# Patient Record
Sex: FEMALE | Race: WHITE | NOT HISPANIC OR LATINO
[De-identification: names, ages, dates, MRNs, and addresses within clinical notes are randomized per-mention and may not be internally consistent; named-entity substitution may affect disease eponyms.]

---

## 2018-03-28 ENCOUNTER — RECORDS - HEALTHEAST (OUTPATIENT)
Dept: ADMINISTRATIVE | Facility: OTHER | Age: 33
End: 2018-03-28

## 2018-04-11 ENCOUNTER — RECORDS - HEALTHEAST (OUTPATIENT)
Dept: ADMINISTRATIVE | Facility: OTHER | Age: 33
End: 2018-04-11

## 2018-04-11 LAB
ABORH_EXT (HISTORICAL CONVERSION): NORMAL
ANTIBODY_EXT (HISTORICAL CONVERSION): NEGATIVE
HBSAG_EXT (HISTORICAL CONVERSION): NORMAL
HCV AB SERPL QL IA: NORMAL
HGB_EXT (HISTORICAL CONVERSION): 12.2
HIV_EXT: NORMAL
PLT_EXT - HISTORICAL: 365
RPR - HISTORICAL: NORMAL
RUBELLA_EXT (HISTORICAL CONVERSION): NORMAL

## 2018-04-25 ENCOUNTER — RECORDS - HEALTHEAST (OUTPATIENT)
Dept: ADMINISTRATIVE | Facility: OTHER | Age: 33
End: 2018-04-25

## 2018-05-04 ENCOUNTER — RECORDS - HEALTHEAST (OUTPATIENT)
Dept: ADMINISTRATIVE | Facility: OTHER | Age: 33
End: 2018-05-04

## 2018-05-24 ENCOUNTER — RECORDS - HEALTHEAST (OUTPATIENT)
Dept: ADMINISTRATIVE | Facility: OTHER | Age: 33
End: 2018-05-24

## 2018-06-04 ENCOUNTER — COMMUNICATION - HEALTHEAST (OUTPATIENT)
Dept: FAMILY MEDICINE | Facility: CLINIC | Age: 33
End: 2018-06-04

## 2018-06-07 ENCOUNTER — OFFICE VISIT - HEALTHEAST (OUTPATIENT)
Dept: MIDWIFE SERVICES | Facility: CLINIC | Age: 33
End: 2018-06-07

## 2018-06-07 DIAGNOSIS — J30.2 SEASONAL ALLERGIES: ICD-10-CM

## 2018-06-07 DIAGNOSIS — R11.2 NAUSEA AND VOMITING: ICD-10-CM

## 2018-06-07 DIAGNOSIS — E66.9 OBESITY: ICD-10-CM

## 2018-06-07 DIAGNOSIS — F12.20 MODERATE TETRAHYDROCANNABINOL (THC) DEPENDENCE (H): ICD-10-CM

## 2018-06-07 DIAGNOSIS — R87.619 ABNORMAL PAP SMEAR OF CERVIX: ICD-10-CM

## 2018-06-07 LAB
ALBUMIN UR-MCNC: ABNORMAL MG/DL
AMORPH CRY #/AREA URNS HPF: ABNORMAL /[HPF]
APPEARANCE UR: CLEAR
BACTERIA #/AREA URNS HPF: ABNORMAL HPF
BILIRUB UR QL STRIP: NEGATIVE
COLOR UR AUTO: YELLOW
GLUCOSE UR STRIP-MCNC: NEGATIVE MG/DL
HGB UR QL STRIP: NEGATIVE
KETONES UR STRIP-MCNC: NEGATIVE MG/DL
LEUKOCYTE ESTERASE UR QL STRIP: NEGATIVE
MUCOUS THREADS #/AREA URNS LPF: ABNORMAL LPF
NITRATE UR QL: NEGATIVE
PH UR STRIP: 7 [PH] (ref 5–8)
RBC #/AREA URNS AUTO: ABNORMAL HPF
SP GR UR STRIP: 1.02 (ref 1–1.03)
SQUAMOUS #/AREA URNS AUTO: ABNORMAL LPF
TRANS CELLS #/AREA URNS HPF: ABNORMAL LPF
UROBILINOGEN UR STRIP-ACNC: ABNORMAL
WBC #/AREA URNS AUTO: ABNORMAL HPF

## 2018-06-07 ASSESSMENT — MIFFLIN-ST. JEOR: SCORE: 1694.91

## 2018-06-08 LAB — BACTERIA SPEC CULT: NO GROWTH

## 2018-06-12 ENCOUNTER — COMMUNICATION - HEALTHEAST (OUTPATIENT)
Dept: MIDWIFE SERVICES | Facility: CLINIC | Age: 33
End: 2018-06-12

## 2018-06-13 ENCOUNTER — PRENATAL OFFICE VISIT - HEALTHEAST (OUTPATIENT)
Dept: MIDWIFE SERVICES | Facility: CLINIC | Age: 33
End: 2018-06-13

## 2018-06-13 DIAGNOSIS — Z34.02 ENCOUNTER FOR SUPERVISION OF NORMAL FIRST PREGNANCY IN SECOND TRIMESTER: ICD-10-CM

## 2018-06-13 DIAGNOSIS — F12.20 MODERATE TETRAHYDROCANNABINOL (THC) DEPENDENCE (H): ICD-10-CM

## 2018-06-13 DIAGNOSIS — R11.2 NAUSEA AND VOMITING: ICD-10-CM

## 2018-06-13 DIAGNOSIS — R82.71 GBS BACTERIURIA: ICD-10-CM

## 2018-06-13 LAB
ALBUMIN SERPL-MCNC: 3.3 G/DL (ref 3.5–5)
ALP SERPL-CCNC: 49 U/L (ref 45–120)
ALT SERPL W P-5'-P-CCNC: 15 U/L (ref 0–45)
ANION GAP SERPL CALCULATED.3IONS-SCNC: 8 MMOL/L (ref 5–18)
AST SERPL W P-5'-P-CCNC: 12 U/L (ref 0–40)
BASOPHILS # BLD AUTO: 0.1 THOU/UL (ref 0–0.2)
BASOPHILS NFR BLD AUTO: 1 % (ref 0–2)
BILIRUB SERPL-MCNC: 0.2 MG/DL (ref 0–1)
BUN SERPL-MCNC: 8 MG/DL (ref 8–22)
CALCIUM SERPL-MCNC: 9.5 MG/DL (ref 8.5–10.5)
CHLORIDE BLD-SCNC: 105 MMOL/L (ref 98–107)
CO2 SERPL-SCNC: 24 MMOL/L (ref 22–31)
CREAT SERPL-MCNC: 0.57 MG/DL (ref 0.6–1.1)
EOSINOPHIL # BLD AUTO: 0.1 THOU/UL (ref 0–0.4)
EOSINOPHIL NFR BLD AUTO: 1 % (ref 0–6)
ERYTHROCYTE [DISTWIDTH] IN BLOOD BY AUTOMATED COUNT: 12.4 % (ref 11–14.5)
GFR SERPL CREATININE-BSD FRML MDRD: >60 ML/MIN/1.73M2
GLUCOSE BLD-MCNC: 82 MG/DL (ref 70–125)
HCT VFR BLD AUTO: 32.8 % (ref 35–47)
HGB BLD-MCNC: 11.7 G/DL (ref 12–16)
LYMPHOCYTES # BLD AUTO: 2.7 THOU/UL (ref 0.8–4.4)
LYMPHOCYTES NFR BLD AUTO: 22 % (ref 20–40)
MCH RBC QN AUTO: 33.6 PG (ref 27–34)
MCHC RBC AUTO-ENTMCNC: 35.7 G/DL (ref 32–36)
MCV RBC AUTO: 94 FL (ref 80–100)
MONOCYTES # BLD AUTO: 0.8 THOU/UL (ref 0–0.9)
MONOCYTES NFR BLD AUTO: 7 % (ref 2–10)
NEUTROPHILS # BLD AUTO: 8.8 THOU/UL (ref 2–7.7)
NEUTROPHILS NFR BLD AUTO: 71 % (ref 50–70)
PLATELET # BLD AUTO: 309 THOU/UL (ref 140–440)
PMV BLD AUTO: 9.9 FL (ref 8.5–12.5)
POTASSIUM BLD-SCNC: 4.3 MMOL/L (ref 3.5–5)
PROT SERPL-MCNC: 6.5 G/DL (ref 6–8)
RBC # BLD AUTO: 3.48 MILL/UL (ref 3.8–5.4)
SODIUM SERPL-SCNC: 137 MMOL/L (ref 136–145)
TSH SERPL DL<=0.005 MIU/L-ACNC: 1.52 UIU/ML (ref 0.3–5)
WBC: 12.5 THOU/UL (ref 4–11)

## 2018-06-13 ASSESSMENT — MIFFLIN-ST. JEOR: SCORE: 1714.42

## 2018-06-19 ENCOUNTER — COMMUNICATION - HEALTHEAST (OUTPATIENT)
Dept: MIDWIFE SERVICES | Facility: CLINIC | Age: 33
End: 2018-06-19

## 2018-06-19 DIAGNOSIS — Z34.02 ENCOUNTER FOR SUPERVISION OF NORMAL FIRST PREGNANCY IN SECOND TRIMESTER: ICD-10-CM

## 2018-06-25 ENCOUNTER — OFFICE VISIT - HEALTHEAST (OUTPATIENT)
Dept: FAMILY MEDICINE | Facility: CLINIC | Age: 33
End: 2018-06-25

## 2018-06-25 DIAGNOSIS — Z34.02 ENCOUNTER FOR SUPERVISION OF NORMAL FIRST PREGNANCY IN SECOND TRIMESTER: ICD-10-CM

## 2018-06-25 DIAGNOSIS — G56.01 CARPAL TUNNEL SYNDROME OF RIGHT WRIST: ICD-10-CM

## 2018-06-25 ASSESSMENT — MIFFLIN-ST. JEOR: SCORE: 1714.42

## 2018-06-28 ENCOUNTER — AMBULATORY - HEALTHEAST (OUTPATIENT)
Dept: FAMILY MEDICINE | Facility: CLINIC | Age: 33
End: 2018-06-28

## 2018-06-28 ENCOUNTER — OFFICE VISIT - HEALTHEAST (OUTPATIENT)
Dept: FAMILY MEDICINE | Facility: CLINIC | Age: 33
End: 2018-06-28

## 2018-06-28 DIAGNOSIS — Z32.01 PREGNANCY TEST POSITIVE: ICD-10-CM

## 2018-06-28 DIAGNOSIS — J02.0 STREP PHARYNGITIS: ICD-10-CM

## 2018-06-28 DIAGNOSIS — J02.0 STREPTOCOCCAL SORE THROAT: ICD-10-CM

## 2018-06-28 LAB — DEPRECATED S PYO AG THROAT QL EIA: ABNORMAL

## 2018-06-28 ASSESSMENT — MIFFLIN-ST. JEOR: SCORE: 1732.56

## 2018-06-29 ENCOUNTER — COMMUNICATION - HEALTHEAST (OUTPATIENT)
Dept: MIDWIFE SERVICES | Facility: CLINIC | Age: 33
End: 2018-06-29

## 2018-06-29 ENCOUNTER — COMMUNICATION - HEALTHEAST (OUTPATIENT)
Dept: FAMILY MEDICINE | Facility: CLINIC | Age: 33
End: 2018-06-29

## 2018-07-03 ENCOUNTER — HOSPITAL ENCOUNTER (OUTPATIENT)
Dept: ULTRASOUND IMAGING | Facility: CLINIC | Age: 33
Discharge: HOME | End: 2018-07-03
Attending: ADVANCED PRACTICE MIDWIFE

## 2018-07-03 DIAGNOSIS — Z34.02 ENCOUNTER FOR SUPERVISION OF NORMAL FIRST PREGNANCY IN SECOND TRIMESTER: ICD-10-CM

## 2018-07-05 ENCOUNTER — AMBULATORY - HEALTHEAST (OUTPATIENT)
Dept: OBGYN | Facility: CLINIC | Age: 33
End: 2018-07-05

## 2018-07-05 ENCOUNTER — COMMUNICATION - HEALTHEAST (OUTPATIENT)
Dept: OBGYN | Facility: CLINIC | Age: 33
End: 2018-07-05

## 2018-07-05 DIAGNOSIS — Z34.02 ENCOUNTER FOR SUPERVISION OF NORMAL FIRST PREGNANCY IN SECOND TRIMESTER: ICD-10-CM

## 2018-07-05 DIAGNOSIS — R93.89 ABNORMAL ULTRASOUND: ICD-10-CM

## 2018-07-06 ENCOUNTER — COMMUNICATION - HEALTHEAST (OUTPATIENT)
Dept: MIDWIFE SERVICES | Facility: CLINIC | Age: 33
End: 2018-07-06

## 2018-07-06 DIAGNOSIS — Z33.1 PREGNANCY, INCIDENTAL: ICD-10-CM

## 2018-07-16 ENCOUNTER — COMMUNICATION - HEALTHEAST (OUTPATIENT)
Dept: FAMILY MEDICINE | Facility: CLINIC | Age: 33
End: 2018-07-16

## 2018-07-16 DIAGNOSIS — Z34.90 PREGNANT: ICD-10-CM

## 2018-07-16 DIAGNOSIS — G56.00 CTS (CARPAL TUNNEL SYNDROME): ICD-10-CM

## 2018-07-17 ENCOUNTER — HOSPITAL ENCOUNTER (OUTPATIENT)
Dept: ULTRASOUND IMAGING | Facility: CLINIC | Age: 33
Discharge: HOME | End: 2018-07-17
Attending: MIDWIFE

## 2018-07-17 DIAGNOSIS — Z34.02 ENCOUNTER FOR SUPERVISION OF NORMAL FIRST PREGNANCY IN SECOND TRIMESTER: ICD-10-CM

## 2018-07-17 DIAGNOSIS — R93.89 ABNORMAL ULTRASOUND: ICD-10-CM

## 2018-07-19 ENCOUNTER — COMMUNICATION - HEALTHEAST (OUTPATIENT)
Dept: MIDWIFE SERVICES | Facility: CLINIC | Age: 33
End: 2018-07-19

## 2018-07-20 ENCOUNTER — AMBULATORY - HEALTHEAST (OUTPATIENT)
Dept: OBGYN | Facility: CLINIC | Age: 33
End: 2018-07-20

## 2018-07-20 DIAGNOSIS — Z34.80 SUPERVISION OF OTHER NORMAL PREGNANCY, ANTEPARTUM: ICD-10-CM

## 2018-07-24 ENCOUNTER — PRENATAL OFFICE VISIT - HEALTHEAST (OUTPATIENT)
Dept: MIDWIFE SERVICES | Facility: CLINIC | Age: 33
End: 2018-07-24

## 2018-07-24 DIAGNOSIS — R11.2 NAUSEA AND VOMITING: ICD-10-CM

## 2018-07-24 DIAGNOSIS — Z34.02 ENCOUNTER FOR SUPERVISION OF NORMAL FIRST PREGNANCY IN SECOND TRIMESTER: ICD-10-CM

## 2018-07-24 ASSESSMENT — MIFFLIN-ST. JEOR: SCORE: 1750.71

## 2018-08-08 ENCOUNTER — COMMUNICATION - HEALTHEAST (OUTPATIENT)
Dept: FAMILY MEDICINE | Facility: CLINIC | Age: 33
End: 2018-08-08

## 2018-08-10 ENCOUNTER — COMMUNICATION - HEALTHEAST (OUTPATIENT)
Dept: MIDWIFE SERVICES | Facility: CLINIC | Age: 33
End: 2018-08-10

## 2018-08-13 ENCOUNTER — RECORDS - HEALTHEAST (OUTPATIENT)
Dept: ADMINISTRATIVE | Facility: OTHER | Age: 33
End: 2018-08-13

## 2018-08-30 ENCOUNTER — COMMUNICATION - HEALTHEAST (OUTPATIENT)
Dept: MIDWIFE SERVICES | Facility: CLINIC | Age: 33
End: 2018-08-30

## 2018-08-31 ENCOUNTER — COMMUNICATION - HEALTHEAST (OUTPATIENT)
Dept: MIDWIFE SERVICES | Facility: CLINIC | Age: 33
End: 2018-08-31

## 2018-08-31 ENCOUNTER — PRENATAL OFFICE VISIT - HEALTHEAST (OUTPATIENT)
Dept: MIDWIFE SERVICES | Facility: CLINIC | Age: 33
End: 2018-08-31

## 2018-08-31 DIAGNOSIS — G47.09 OTHER INSOMNIA: ICD-10-CM

## 2018-08-31 DIAGNOSIS — R11.2 NAUSEA AND VOMITING: ICD-10-CM

## 2018-08-31 DIAGNOSIS — O26.02 EXCESSIVE WEIGHT GAIN DURING PREGNANCY IN SECOND TRIMESTER: ICD-10-CM

## 2018-08-31 DIAGNOSIS — Z34.02 ENCOUNTER FOR SUPERVISION OF NORMAL FIRST PREGNANCY IN SECOND TRIMESTER: ICD-10-CM

## 2018-08-31 DIAGNOSIS — E66.812 CLASS 2 OBESITY IN ADULT: ICD-10-CM

## 2018-08-31 LAB
FASTING STATUS PATIENT QL REPORTED: NO
GLUCOSE 1H P 50 G GLC PO SERPL-MCNC: 115 MG/DL (ref 70–139)
HGB BLD-MCNC: 10.6 G/DL (ref 12–16)

## 2018-08-31 ASSESSMENT — MIFFLIN-ST. JEOR: SCORE: 1796.07

## 2018-09-01 ENCOUNTER — COMMUNICATION - HEALTHEAST (OUTPATIENT)
Dept: OBGYN | Facility: CLINIC | Age: 33
End: 2018-09-01

## 2018-09-01 LAB — T PALLIDUM AB SER QL: NEGATIVE

## 2018-09-04 ENCOUNTER — AMBULATORY - HEALTHEAST (OUTPATIENT)
Dept: MIDWIFE SERVICES | Facility: CLINIC | Age: 33
End: 2018-09-04

## 2018-09-28 ENCOUNTER — PRENATAL OFFICE VISIT - HEALTHEAST (OUTPATIENT)
Dept: MIDWIFE SERVICES | Facility: CLINIC | Age: 33
End: 2018-09-28

## 2018-09-28 DIAGNOSIS — Z23 NEED FOR VACCINATION: ICD-10-CM

## 2018-09-28 DIAGNOSIS — Z34.03 ENCOUNTER FOR SUPERVISION OF NORMAL FIRST PREGNANCY IN THIRD TRIMESTER: ICD-10-CM

## 2018-09-28 ASSESSMENT — MIFFLIN-ST. JEOR: SCORE: 1836.89

## 2018-10-03 ENCOUNTER — COMMUNICATION - HEALTHEAST (OUTPATIENT)
Dept: OBGYN | Facility: CLINIC | Age: 33
End: 2018-10-03

## 2018-10-08 ENCOUNTER — COMMUNICATION - HEALTHEAST (OUTPATIENT)
Dept: MIDWIFE SERVICES | Facility: CLINIC | Age: 33
End: 2018-10-08

## 2018-10-09 ENCOUNTER — PRENATAL OFFICE VISIT - HEALTHEAST (OUTPATIENT)
Dept: MIDWIFE SERVICES | Facility: CLINIC | Age: 33
End: 2018-10-09

## 2018-10-09 DIAGNOSIS — Z34.03 ENCOUNTER FOR SUPERVISION OF NORMAL FIRST PREGNANCY IN THIRD TRIMESTER: ICD-10-CM

## 2018-10-09 DIAGNOSIS — M54.9 BACK PAIN IN PREGNANCY: ICD-10-CM

## 2018-10-09 DIAGNOSIS — O99.891 BACK PAIN IN PREGNANCY: ICD-10-CM

## 2018-10-09 DIAGNOSIS — O99.210 OBESITY AFFECTING PREGNANCY, ANTEPARTUM: ICD-10-CM

## 2018-10-09 ASSESSMENT — MIFFLIN-ST. JEOR: SCORE: 1820.34

## 2018-10-12 ENCOUNTER — COMMUNICATION - HEALTHEAST (OUTPATIENT)
Dept: MIDWIFE SERVICES | Facility: CLINIC | Age: 33
End: 2018-10-12

## 2018-10-12 ENCOUNTER — RECORDS - HEALTHEAST (OUTPATIENT)
Dept: ADMINISTRATIVE | Facility: OTHER | Age: 33
End: 2018-10-12

## 2018-10-15 ENCOUNTER — RECORDS - HEALTHEAST (OUTPATIENT)
Dept: ADMINISTRATIVE | Facility: OTHER | Age: 33
End: 2018-10-15

## 2018-10-15 ENCOUNTER — PRENATAL OFFICE VISIT - HEALTHEAST (OUTPATIENT)
Dept: MIDWIFE SERVICES | Facility: CLINIC | Age: 33
End: 2018-10-15

## 2018-10-15 ENCOUNTER — COMMUNICATION - HEALTHEAST (OUTPATIENT)
Dept: MIDWIFE SERVICES | Facility: CLINIC | Age: 33
End: 2018-10-15

## 2018-10-15 ENCOUNTER — AMBULATORY - HEALTHEAST (OUTPATIENT)
Dept: MIDWIFE SERVICES | Facility: CLINIC | Age: 33
End: 2018-10-15

## 2018-10-15 DIAGNOSIS — O99.891 BACK PAIN COMPLICATING PREGNANCY IN THIRD TRIMESTER: ICD-10-CM

## 2018-10-15 DIAGNOSIS — M54.9 BACK PAIN COMPLICATING PREGNANCY IN THIRD TRIMESTER: ICD-10-CM

## 2018-10-15 DIAGNOSIS — Z34.03 ENCOUNTER FOR SUPERVISION OF NORMAL FIRST PREGNANCY IN THIRD TRIMESTER: ICD-10-CM

## 2018-10-15 RX ORDER — ASPIRIN/SOD BICARB/CITRIC ACID 324 MG
TABLET, EFFERVESCENT ORAL
Refills: 2 | Status: SHIPPED | COMMUNITY
Start: 2018-08-17

## 2018-10-15 ASSESSMENT — MIFFLIN-ST. JEOR: SCORE: 1843.92

## 2018-10-18 ENCOUNTER — OFFICE VISIT - HEALTHEAST (OUTPATIENT)
Dept: OBGYN | Facility: CLINIC | Age: 33
End: 2018-10-18

## 2018-10-18 ENCOUNTER — COMMUNICATION - HEALTHEAST (OUTPATIENT)
Dept: OBGYN | Facility: CLINIC | Age: 33
End: 2018-10-18

## 2018-10-18 DIAGNOSIS — Z71.89 PRENATAL CONSULT: ICD-10-CM

## 2018-10-18 DIAGNOSIS — Z34.03 ENCOUNTER FOR SUPERVISION OF NORMAL FIRST PREGNANCY IN THIRD TRIMESTER: ICD-10-CM

## 2018-10-18 DIAGNOSIS — Z30.09 STERILIZATION CONSULT: ICD-10-CM

## 2018-10-18 ASSESSMENT — MIFFLIN-ST. JEOR: SCORE: 1856.62

## 2018-10-19 ENCOUNTER — COMMUNICATION - HEALTHEAST (OUTPATIENT)
Dept: OBGYN | Facility: CLINIC | Age: 33
End: 2018-10-19

## 2018-10-26 ENCOUNTER — PRENATAL OFFICE VISIT - HEALTHEAST (OUTPATIENT)
Dept: MIDWIFE SERVICES | Facility: CLINIC | Age: 33
End: 2018-10-26

## 2018-10-26 ENCOUNTER — HOSPITAL ENCOUNTER (OUTPATIENT)
Dept: ULTRASOUND IMAGING | Facility: CLINIC | Age: 33
Discharge: HOME | End: 2018-10-26
Attending: ADVANCED PRACTICE MIDWIFE

## 2018-10-26 DIAGNOSIS — Z34.03 ENCOUNTER FOR SUPERVISION OF NORMAL FIRST PREGNANCY IN THIRD TRIMESTER: ICD-10-CM

## 2018-10-26 DIAGNOSIS — O99.210 OBESITY AFFECTING PREGNANCY, ANTEPARTUM: ICD-10-CM

## 2018-10-26 LAB
AMPHETAMINES UR QL SCN: NORMAL
BARBITURATES UR QL: NORMAL
BENZODIAZ UR QL: NORMAL
CANNABINOIDS UR QL SCN: NORMAL
COCAINE UR QL: NORMAL
CREAT UR-MCNC: 47.7 MG/DL
HGB BLD-MCNC: 11.6 G/DL (ref 12–16)
METHADONE UR QL SCN: NORMAL
OPIATES UR QL SCN: NORMAL
OXYCODONE UR QL: NORMAL
PCP UR QL SCN: NORMAL

## 2018-10-26 ASSESSMENT — MIFFLIN-ST. JEOR: SCORE: 1861.16

## 2018-10-27 ENCOUNTER — OFFICE VISIT - HEALTHEAST (OUTPATIENT)
Dept: FAMILY MEDICINE | Facility: CLINIC | Age: 33
End: 2018-10-27

## 2018-10-27 DIAGNOSIS — J02.9 SORE THROAT: ICD-10-CM

## 2018-10-27 LAB — DEPRECATED S PYO AG THROAT QL EIA: NORMAL

## 2018-10-28 LAB — GROUP A STREP BY PCR: NORMAL

## 2018-10-30 ENCOUNTER — COMMUNICATION - HEALTHEAST (OUTPATIENT)
Dept: MIDWIFE SERVICES | Facility: CLINIC | Age: 33
End: 2018-10-30

## 2018-11-01 ENCOUNTER — AMBULATORY - HEALTHEAST (OUTPATIENT)
Dept: MIDWIFE SERVICES | Facility: CLINIC | Age: 33
End: 2018-11-01

## 2018-11-01 ENCOUNTER — HOSPITAL ENCOUNTER (OUTPATIENT)
Dept: ULTRASOUND IMAGING | Facility: CLINIC | Age: 33
Discharge: HOME | End: 2018-11-01
Attending: ADVANCED PRACTICE MIDWIFE

## 2018-11-01 ENCOUNTER — PRENATAL OFFICE VISIT - HEALTHEAST (OUTPATIENT)
Dept: MIDWIFE SERVICES | Facility: CLINIC | Age: 33
End: 2018-11-01

## 2018-11-01 DIAGNOSIS — Z34.03 ENCOUNTER FOR SUPERVISION OF NORMAL FIRST PREGNANCY IN THIRD TRIMESTER: ICD-10-CM

## 2018-11-01 DIAGNOSIS — E66.812 CLASS 2 OBESITY IN ADULT: ICD-10-CM

## 2018-11-01 DIAGNOSIS — O99.210 OBESITY AFFECTING PREGNANCY, ANTEPARTUM: ICD-10-CM

## 2018-11-01 ASSESSMENT — MIFFLIN-ST. JEOR: SCORE: 1881.57

## 2018-11-08 ENCOUNTER — PRENATAL OFFICE VISIT - HEALTHEAST (OUTPATIENT)
Dept: MIDWIFE SERVICES | Facility: CLINIC | Age: 33
End: 2018-11-08

## 2018-11-08 ENCOUNTER — HOSPITAL ENCOUNTER (OUTPATIENT)
Dept: ULTRASOUND IMAGING | Facility: CLINIC | Age: 33
Discharge: HOME | End: 2018-11-08
Attending: ADVANCED PRACTICE MIDWIFE

## 2018-11-08 DIAGNOSIS — O16.3 ELEVATED BLOOD PRESSURE AFFECTING PREGNANCY IN THIRD TRIMESTER, ANTEPARTUM: ICD-10-CM

## 2018-11-08 DIAGNOSIS — O99.210 OBESITY AFFECTING PREGNANCY, ANTEPARTUM: ICD-10-CM

## 2018-11-08 DIAGNOSIS — Z34.03 ENCOUNTER FOR SUPERVISION OF NORMAL FIRST PREGNANCY IN THIRD TRIMESTER: ICD-10-CM

## 2018-11-08 LAB
ALT SERPL W P-5'-P-CCNC: 16 U/L (ref 0–45)
APTT PPP: 28 SECONDS (ref 24–37)
AST SERPL W P-5'-P-CCNC: 19 U/L (ref 0–40)
CREAT SERPL-MCNC: 0.64 MG/DL (ref 0.6–1.1)
CREAT UR-MCNC: 160.3 MG/DL
ERYTHROCYTE [DISTWIDTH] IN BLOOD BY AUTOMATED COUNT: 13.9 % (ref 11–14.5)
GFR SERPL CREATININE-BSD FRML MDRD: >60 ML/MIN/1.73M2
HCT VFR BLD AUTO: 35.1 % (ref 35–47)
HGB BLD-MCNC: 12 G/DL (ref 12–16)
INR PPP: 0.96 (ref 0.9–1.1)
MCH RBC QN AUTO: 33.6 PG (ref 27–34)
MCHC RBC AUTO-ENTMCNC: 34.2 G/DL (ref 32–36)
MCV RBC AUTO: 98 FL (ref 80–100)
PLATELET # BLD AUTO: 297 THOU/UL (ref 140–440)
PMV BLD AUTO: 11.5 FL (ref 8.5–12.5)
PROTEIN, RANDOM URINE - HISTORICAL: 40 MG/DL
PROTEIN/CREAT RATIO, RANDOM UR: 0.25
RBC # BLD AUTO: 3.57 MILL/UL (ref 3.8–5.4)
URATE SERPL-MCNC: 4.9 MG/DL (ref 2–7.5)
WBC: 10.6 THOU/UL (ref 4–11)

## 2018-11-08 ASSESSMENT — MIFFLIN-ST. JEOR: SCORE: 1871.59

## 2018-11-09 ENCOUNTER — COMMUNICATION - HEALTHEAST (OUTPATIENT)
Dept: OBGYN | Facility: CLINIC | Age: 33
End: 2018-11-09

## 2018-11-09 ENCOUNTER — PRENATAL OFFICE VISIT - HEALTHEAST (OUTPATIENT)
Dept: MIDWIFE SERVICES | Facility: CLINIC | Age: 33
End: 2018-11-09

## 2018-11-09 DIAGNOSIS — O13.3 GESTATIONAL HYPERTENSION WITHOUT SIGNIFICANT PROTEINURIA IN THIRD TRIMESTER: ICD-10-CM

## 2018-11-09 ASSESSMENT — MIFFLIN-ST. JEOR: SCORE: 1838.48

## 2018-11-12 ENCOUNTER — SURGERY - HEALTHEAST (OUTPATIENT)
Dept: SURGERY | Facility: CLINIC | Age: 33
End: 2018-11-12

## 2018-11-12 ENCOUNTER — ANESTHESIA - HEALTHEAST (OUTPATIENT)
Dept: SURGERY | Facility: CLINIC | Age: 33
End: 2018-11-12

## 2018-11-15 ENCOUNTER — HOME CARE/HOSPICE - HEALTHEAST (OUTPATIENT)
Dept: HOME HEALTH SERVICES | Facility: HOME HEALTH | Age: 33
End: 2018-11-15

## 2018-11-16 ENCOUNTER — HOME CARE/HOSPICE - HEALTHEAST (OUTPATIENT)
Dept: HOME HEALTH SERVICES | Facility: HOME HEALTH | Age: 33
End: 2018-11-16

## 2019-01-08 ENCOUNTER — COMMUNICATION - HEALTHEAST (OUTPATIENT)
Dept: MIDWIFE SERVICES | Facility: CLINIC | Age: 34
End: 2019-01-08

## 2019-02-04 ENCOUNTER — OFFICE VISIT - HEALTHEAST (OUTPATIENT)
Dept: MIDWIFE SERVICES | Facility: CLINIC | Age: 34
End: 2019-02-04

## 2019-02-04 DIAGNOSIS — E66.01 MORBID OBESITY (H): ICD-10-CM

## 2019-02-04 RX ORDER — DEXTROAMPHETAMINE SACCHARATE, AMPHETAMINE ASPARTATE, DEXTROAMPHETAMINE SULFATE AND AMPHETAMINE SULFATE 2.5; 2.5; 2.5; 2.5 MG/1; MG/1; MG/1; MG/1
TABLET ORAL
Refills: 0 | Status: SHIPPED | COMMUNITY
Start: 2019-01-28

## 2019-02-04 ASSESSMENT — MIFFLIN-ST. JEOR: SCORE: 1834.85

## 2019-09-21 ENCOUNTER — RECORDS - HEALTHEAST (OUTPATIENT)
Dept: GENERAL RADIOLOGY | Facility: CLINIC | Age: 34
End: 2019-09-21

## 2019-09-21 ENCOUNTER — OFFICE VISIT - HEALTHEAST (OUTPATIENT)
Dept: FAMILY MEDICINE | Facility: CLINIC | Age: 34
End: 2019-09-21

## 2019-09-21 DIAGNOSIS — M54.42 ACUTE LEFT-SIDED LOW BACK PAIN WITH LEFT-SIDED SCIATICA: ICD-10-CM

## 2019-09-21 DIAGNOSIS — M54.42 LUMBAGO WITH SCIATICA, LEFT SIDE: ICD-10-CM

## 2019-09-21 RX ORDER — ACETAMINOPHEN AND CODEINE PHOSPHATE 300; 30 MG/1; MG/1
1 TABLET ORAL 3 TIMES DAILY PRN
Qty: 30 TABLET | Refills: 0 | Status: SHIPPED | OUTPATIENT
Start: 2019-09-21

## 2019-09-26 ENCOUNTER — OFFICE VISIT - HEALTHEAST (OUTPATIENT)
Dept: FAMILY MEDICINE | Facility: CLINIC | Age: 34
End: 2019-09-26

## 2019-09-26 DIAGNOSIS — Z23 NEED FOR INFLUENZA VACCINATION: ICD-10-CM

## 2019-09-26 DIAGNOSIS — G89.29 CHRONIC BILATERAL LOW BACK PAIN WITHOUT SCIATICA: ICD-10-CM

## 2019-09-26 DIAGNOSIS — M54.50 CHRONIC BILATERAL LOW BACK PAIN WITHOUT SCIATICA: ICD-10-CM

## 2019-09-26 RX ORDER — DEXTROAMPHETAMINE SACCHARATE, AMPHETAMINE ASPARTATE MONOHYDRATE, DEXTROAMPHETAMINE SULFATE AND AMPHETAMINE SULFATE 7.5; 7.5; 7.5; 7.5 MG/1; MG/1; MG/1; MG/1
CAPSULE, EXTENDED RELEASE ORAL
Refills: 0 | Status: SHIPPED | COMMUNITY
Start: 2019-09-10

## 2019-09-26 ASSESSMENT — ANXIETY QUESTIONNAIRES
1. FEELING NERVOUS, ANXIOUS, OR ON EDGE: NOT AT ALL
5. BEING SO RESTLESS THAT IT IS HARD TO SIT STILL: NOT AT ALL
6. BECOMING EASILY ANNOYED OR IRRITABLE: NOT AT ALL
7. FEELING AFRAID AS IF SOMETHING AWFUL MIGHT HAPPEN: NOT AT ALL
3. WORRYING TOO MUCH ABOUT DIFFERENT THINGS: NOT AT ALL
2. NOT BEING ABLE TO STOP OR CONTROL WORRYING: NOT AT ALL
GAD7 TOTAL SCORE: 0
4. TROUBLE RELAXING: NOT AT ALL

## 2019-09-26 ASSESSMENT — PATIENT HEALTH QUESTIONNAIRE - PHQ9: SUM OF ALL RESPONSES TO PHQ QUESTIONS 1-9: 1

## 2019-09-27 ENCOUNTER — COMMUNICATION - HEALTHEAST (OUTPATIENT)
Dept: FAMILY MEDICINE | Facility: CLINIC | Age: 34
End: 2019-09-27

## 2019-09-27 DIAGNOSIS — M54.42 ACUTE LEFT-SIDED LOW BACK PAIN WITH LEFT-SIDED SCIATICA: ICD-10-CM

## 2019-09-27 RX ORDER — CYCLOBENZAPRINE HCL 10 MG
10 TABLET ORAL 3 TIMES DAILY PRN
Qty: 30 TABLET | Refills: 0 | Status: SHIPPED | OUTPATIENT
Start: 2019-09-27

## 2019-11-22 ENCOUNTER — COMMUNICATION - HEALTHEAST (OUTPATIENT)
Dept: MIDWIFE SERVICES | Facility: CLINIC | Age: 34
End: 2019-11-22

## 2021-05-26 ASSESSMENT — PATIENT HEALTH QUESTIONNAIRE - PHQ9: SUM OF ALL RESPONSES TO PHQ QUESTIONS 1-9: 1

## 2021-05-28 ASSESSMENT — ANXIETY QUESTIONNAIRES: GAD7 TOTAL SCORE: 0

## 2021-06-01 VITALS — BODY MASS INDEX: 36.94 KG/M2 | WEIGHT: 221.7 LBS | HEIGHT: 65 IN

## 2021-06-01 VITALS — HEIGHT: 65 IN | WEIGHT: 226 LBS | BODY MASS INDEX: 37.65 KG/M2

## 2021-06-01 VITALS — HEIGHT: 65 IN | BODY MASS INDEX: 38.32 KG/M2 | WEIGHT: 230 LBS

## 2021-06-01 VITALS — BODY MASS INDEX: 38.99 KG/M2 | HEIGHT: 65 IN | WEIGHT: 234 LBS

## 2021-06-01 NOTE — PROGRESS NOTES
Subjective:      Patient ID: Ton López is a 34 y.o. female.    Chief Complaint:    Back Pain   This is a new (having lower back pain since a week ago but started having worse pain yesterday as she was about to  her baby. Has history of ?disc protrusion and back pain while pregnant.) problem. The current episode started in the past 7 days. The problem occurs constantly. The pain is present in the lumbar spine. The quality of the pain is described as burning, shooting and stabbing. Radiates to: radiates to the left buttock. The pain is moderate. The symptoms are aggravated by bending, standing and sitting. Associated symptoms include leg pain. Pertinent negatives include no abdominal pain, bladder incontinence, bowel incontinence, fever, numbness, paresthesias, tingling or weakness. Risk factors include obesity and sedentary lifestyle. She has tried NSAIDs for the symptoms. The treatment provided mild relief.       The following portions of the patient's history were reviewed and updated as appropriate: allergies, current medications, past family history, past medical history, past social history, past surgical history and problem list.       Past Medical History:   Diagnosis Date     ADD (attention deficit disorder)     Prior to conception was taking Adderall 60 mg extended release daily and 10 mg instant release daily     Infertility, female     Unprotected sexual intercourse for 6 years before conception     Moderate tetrahydrocannabinol (THC) dependence (H)      Obesity      Seasonal allergies        Past Surgical History:   Procedure Laterality Date     APPENDECTOMY  1995       Family History   Problem Relation Age of Onset     Heart disease Mother      Heart disease Brother      Diabetes Maternal Grandmother      Cancer Maternal Grandfather        Social History     Tobacco Use     Smoking status: Never Smoker     Smokeless tobacco: Never Used   Substance Use Topics     Alcohol use: No     Drug  use: Yes     Types: Marijuana     Comment: several x  a day       Review of Systems   Constitutional: Negative for fatigue and fever.   Respiratory: Negative for cough.    Cardiovascular: Negative.    Gastrointestinal: Negative for abdominal pain and bowel incontinence.   Genitourinary: Negative for bladder incontinence.   Musculoskeletal: Positive for back pain and gait problem.   Skin: Negative for rash.   Neurological: Negative for tingling, weakness, numbness and paresthesias.       Objective:     /80   Pulse 63   Temp 98.3  F (36.8  C) (Oral)   Resp 16   Wt (!) 234 lb 4.8 oz (106.3 kg)   SpO2 97%   BMI 38.99 kg/m      Physical Exam   Constitutional: She appears well-developed and well-nourished. She appears distressed.   Some pain distress.   HENT:   Head: Normocephalic.   Neck: Normal range of motion.   Cardiovascular: Intact distal pulses.   Pulmonary/Chest: Effort normal.   Musculoskeletal:        Lumbar back: She exhibits decreased range of motion, pain and spasm.   There was an improvement on the back pain following use of Toradol.   Neurological: She is alert.       Assessment:     Procedures    1. Acute left-sided low back pain with left-sided sciatica  - XR Lumbar Spine 2 or 3 VWS; Future  - ketorolac injection 60 mg (TORADOL)  - cyclobenzaprine (FLEXERIL) 5 MG tablet; Take 1 tablet (5 mg total) by mouth 3 (three) times a day as needed for muscle spasms.  Dispense: 30 tablet; Refill: 0  - acetaminophen-codeine (TYLENOL #3) 300-30 mg per tablet; Take 1 tablet by mouth 3 (three) times a day as needed for pain.  Dispense: 30 tablet; Refill: 0      EXAM: XR LUMBAR SPINE 2 OR 3 VWS  LOCATION: [facility]  DATE/TIME: 9/21/2019 1:03 PM     INDICATION: Lower back pain  COMPARISON: None available at time of dictation  TECHNIQUE: Views of the spine were obtained and reviewed.     FINDINGS:  No acute fracture or spinal malalignment. Multilevel degenerative changes most prominent in the  L5-S1 where there is disc height loss and facet arthropathy.    Plan:   I am going to treat her as noted above at this time, as noted she does have any improvement with use of Toradol shot.  I have given her Tylenol codeine as well as muscle relaxants.  She does not have a primary care physician and I did encourage her to find one so that she can follow-up for the back pain.  The x-ray was reviewed as noted.  If she does continue to have more pain we will have seen as soon as possible.

## 2021-06-01 NOTE — PROGRESS NOTES
ASSESSMENT:  1. Chronic bilateral low back pain without sciatica    I did give her some of the cyclobenzaprine that seemed to work for her as far as her back pain goes.  She had gotten some Tylenol threes as well but hopefully she will be able to taper off of those.  We can talk at some point in the future about managing her back pain in a better way with therapy or anti-inflammatories or if more activity and losing weight etc. but we will get her past this acute flare of her chronic back pain and follow-up with her in a couple of weeks.    2. Need for influenza vaccination    - Influenza,Seasonal,Quad,INJ =/>6months          PLAN:  There are no Patient Instructions on file for this visit.    Orders Placed This Encounter   Procedures     Influenza,Seasonal,Quad,INJ =/>6months     Medications Discontinued During This Encounter   Medication Reason     oxyCODONE (ROXICODONE) 5 MG immediate release tablet Therapy completed     pyridoxine, vitamin B6, (VITAMIN B-6) 25 MG tablet Therapy completed     senna (SENOKOT) 8.6 mg tablet Therapy completed     ondansetron (ZOFRAN) 4 MG tablet Therapy completed     magnesium 200 mg Tab Therapy completed     folic acid (FOLVITE) 1 MG tablet Therapy completed     acetaminophen (TYLENOL) 500 MG tablet Therapy completed     calcium-vitamin D 500 mg(1,250mg) -200 unit per tablet Therapy completed     cetirizine (ZYRTEC) 10 MG tablet Therapy completed     cholecalciferol, vitamin D3, 2,000 unit capsule Therapy completed       No follow-ups on file.    CHIEF COMPLAINT:  Chief Complaint   Patient presents with     Establish Care     F/U from [facility] for Lt LBP/sciatica - meds given at [facility] have been helpful       HISTORY OF PRESENT ILLNESS:  Ton is a 34 y.o. female presenting to the clinic today for follow-up from the ED.  She went in with back pain and that note can be found in the chart.  We did review that together today.  She went in with back pain in the gave her some cyclobenzaprine  and that has helped her back pain and she would like to have a refill of that if possible.  They did give her all that many.  I also gave her a Toradol injection and did some x-rays.  They also gave her a few Tylenol threes but we discussed that understanding that that would be a short-term medication we would not treat her ongoing back pain with narcotics.  Her x-ray was unremarkable.    She does state that the back is better and that the cyclobenzaprine has been somewhat helpful.  She has been using some heat on it as well.  We did discuss some stretching and some other modalities such as physical therapy that we could consider going forward.    REVIEW OF SYSTEMS:     All other systems are negative.    PFSH:    Reviewed      TOBACCO USE:  Social History     Tobacco Use   Smoking Status Never Smoker   Smokeless Tobacco Never Used       VITALS:  Vitals:    09/26/19 1415   BP: 104/66   Patient Site: Left Arm   Patient Position: Sitting   Cuff Size: Adult Large   Pulse: 60   SpO2: 100%   Weight: (!) 233 lb 11.2 oz (106 kg)     Wt Readings from Last 3 Encounters:   09/26/19 (!) 233 lb 11.2 oz (106 kg)   09/21/19 (!) 234 lb 4.8 oz (106.3 kg)   02/04/19 (!) 252 lb 3.2 oz (114.4 kg)     Body mass index is 38.89 kg/m .    PHYSICAL EXAM:  Constitutional:  Well appearing patient in no acute distress.  Vitals:  Per nursing notes.    HEENT:  Normocephalic, atraumatic.  Ears are clear bilaterally, with no fluid or redness, and landmarks visible.  Pupils are equal and reactive to light, extraocular muscles intact, visual fields are full.  Nose is normal, and oropharynx is clear without redness.    Neck is without lymphadenopathy.    Lungs:  Clear to auscultation bilaterally without wheezes, rales or rhonchi.   Cardiac:  Regular rate and rhythm without murmurs, rubs, or gallops.     Legs show no cyanosis, clubbing or edema.  Palpation of the distal pulses are normal and symmetric.    Neurologic:  Cranial nerves II-XII intact.    Normal and symmetric reflexes in extremities, with normal strength and sensation.  Psychiatric:  Mood appropriate, memory intact.       ADDITIONAL HISTORY SUMMARIZED (2): [facility] notes reviewed  CARE EVERYWHERE/ EXTRA INFORMATION (1): None.   RADIOLOGY TESTS (1): Xray reviewed  LABS (1):   CARDIOLOGY/MEDICINE TESTS (1):   INDEPENDENT REVIEW (2 each): None.     Total data points:3            MEDICATIONS:  Current Outpatient Medications   Medication Sig Dispense Refill     acetaminophen-codeine (TYLENOL #3) 300-30 mg per tablet Take 1 tablet by mouth 3 (three) times a day as needed for pain. 30 tablet 0     ANTACID, CALCIUM CARBONATE, 200 mg calcium (500 mg) chewable tablet TK 2 TS PO TID  2     dextroamphetamine-amphetamine (ADDERALL XR) 30 MG 24 hr capsule TK 1 C PO BID DO NOT FILL UNTIL 07/05/2019  0     dextroamphetamine-amphetamine (ADDERALL) 10 mg Tab tablet TK 1 T PO BID PRN  0     cyclobenzaprine (FLEXERIL) 10 MG tablet Take 1 tablet (10 mg total) by mouth 3 (three) times a day as needed for muscle spasms. 30 tablet 0     No current facility-administered medications for this visit.

## 2021-06-01 NOTE — TELEPHONE ENCOUNTER
Medication Question or Clarification  Who is calling: Patient  What medication are you calling about? (include dose and sig)   cyclobenzaprine (FLEXERIL) 5 MG tablet 30 tablet 0 9/21/2019     Sig - Route: Take 1 tablet (5 mg total) by mouth 3 (three) times a day as needed for muscle spasms. - Oral    Sent to pharmacy as: cyclobenzaprine (FLEXERIL) 5 MG tablet      Who prescribed the medication?:   Gen Murray MD  What is your question/concern?:   Patient states she has 3 doses left of above medication.  Patient is taking differently at 10 mg, 3 times a day.  Patient is requesting the 10 mg tablets.  [id number]  Okay to leave a detailed message?: Yes  Site CMT - Please call the pharmacy to obtain any additional needed information.

## 2021-06-01 NOTE — TELEPHONE ENCOUNTER
Pt has increased the dosage   Would need an early refill    Please advise    Brandee Spears, CMA

## 2021-06-02 VITALS — BODY MASS INDEX: 43.32 KG/M2 | HEIGHT: 65 IN

## 2021-06-02 VITALS — WEIGHT: 258 LBS | BODY MASS INDEX: 42.93 KG/M2

## 2021-06-02 VITALS — HEIGHT: 65 IN | BODY MASS INDEX: 42.99 KG/M2 | WEIGHT: 258 LBS

## 2021-06-02 VITALS — WEIGHT: 262.5 LBS | BODY MASS INDEX: 43.74 KG/M2 | HEIGHT: 65 IN

## 2021-06-02 VITALS — HEIGHT: 65 IN | BODY MASS INDEX: 42.15 KG/M2 | WEIGHT: 253 LBS

## 2021-06-02 VITALS — HEIGHT: 65 IN | WEIGHT: 257 LBS | BODY MASS INDEX: 42.82 KG/M2

## 2021-06-02 VITALS — HEIGHT: 65 IN | WEIGHT: 252.2 LBS | BODY MASS INDEX: 42.02 KG/M2

## 2021-06-02 VITALS — HEIGHT: 65 IN | BODY MASS INDEX: 41.48 KG/M2 | WEIGHT: 249 LBS

## 2021-06-02 VITALS — BODY MASS INDEX: 42.15 KG/M2 | HEIGHT: 65 IN | WEIGHT: 253 LBS

## 2021-06-02 VITALS — HEIGHT: 65 IN | WEIGHT: 254.2 LBS | BODY MASS INDEX: 42.35 KG/M2

## 2021-06-02 VITALS — BODY MASS INDEX: 40.65 KG/M2 | HEIGHT: 65 IN | WEIGHT: 244 LBS

## 2021-06-02 VITALS — WEIGHT: 260.3 LBS | HEIGHT: 65 IN | BODY MASS INDEX: 43.37 KG/M2

## 2021-06-03 VITALS
SYSTOLIC BLOOD PRESSURE: 104 MMHG | HEART RATE: 60 BPM | WEIGHT: 233.7 LBS | BODY MASS INDEX: 38.89 KG/M2 | OXYGEN SATURATION: 100 % | DIASTOLIC BLOOD PRESSURE: 66 MMHG

## 2021-06-03 VITALS
RESPIRATION RATE: 16 BRPM | SYSTOLIC BLOOD PRESSURE: 120 MMHG | HEART RATE: 63 BPM | TEMPERATURE: 98.3 F | WEIGHT: 234.3 LBS | DIASTOLIC BLOOD PRESSURE: 80 MMHG | OXYGEN SATURATION: 97 % | BODY MASS INDEX: 38.99 KG/M2

## 2021-06-06 ENCOUNTER — HEALTH MAINTENANCE LETTER (OUTPATIENT)
Age: 36
End: 2021-06-06

## 2021-06-16 PROBLEM — G47.09 OTHER INSOMNIA: Status: ACTIVE | Noted: 2018-08-31

## 2021-06-16 PROBLEM — E66.01 MORBID OBESITY (H): Status: ACTIVE | Noted: 2019-02-04

## 2021-06-18 NOTE — PROGRESS NOTES
Assessment / Impression     1. Carpal tunnel syndrome of right wrist     2. Encounter for supervision of normal first pregnancy in second trimester           Plan:     Given patient's history, including the fact that she is into the latter portion of her second trimester pregnancy, as well as findings on physical exam, symptoms likely due to carpal tunnel syndrome.  Discussed with patient etiologies, especially during pregnancy, and oftentimes after delivery of baby, women symptoms may resolve.  For this reason, for now I do recommend wrist splinting at night.  Offered patient respond, though she says she has a very similar splint at home, will begin wearing at night.  Follow-up if symptoms persist despite night splinting over the next 2-3 weeks, could consider referral to orthopedics for further evaluation at that time.  Patient understands, agrees with plan.  -Of note we did have recent normal TSH lab checked.    Subjective:      HPI: Ton López is a 33 y.o. righthanded female, new to family medicine, who presents for right wrist and hand pain.  Patient states she had has had intermittent hand tingling and numbness for about the last year, and it would come on usually at night or as the day went on, and if she shook out her hand her symptoms seem to resolve.  Patient is approximately 5 months pregnant currently, and stated that about the last 2 weeks, she noted more frequent right wrist and tingling sensation in her palm and her second and third digits.  She has never noted any symptoms in her fifth digit.  Symptoms seem to be worst in the middle the night.  She has tried taking Tylenol, though has not really helped her symptoms.  She did wear a wrist splint briefly several months ago though not recently.  No symptoms in her left hand.      Medical History:     Patient Active Problem List   Diagnosis     Nausea and vomiting     Obesity     Moderate tetrahydrocannabinol (THC) dependence (H)     Abnormal Pap  smear of cervix     Seasonal allergies     Encounter for supervision of normal first pregnancy in second trimester     GBS bacteriuria       Past Medical History:   Diagnosis Date     Abnormal Pap smear of cervix 2016     ADD (attention deficit disorder)     Prior to conception was taking Adderall 60 mg extended release daily and 10 mg instant release daily     Headache      Infertility, female     Unprotected sexual intercourse for 6 years before conception     Moderate tetrahydrocannabinol (THC) dependence (H)      Obesity      Seasonal allergies        Past Surgical History:   Procedure Laterality Date     APPENDECTOMY  1995       Current Medications:     Current Outpatient Prescriptions   Medication Sig Note     calcium, as carbonate, (OS-ARNALDO) 500 mg calcium (1,250 mg) tablet Take 2 tablets (1,000 mg total) by mouth 3 (three) times a day.      cetirizine (ZYRTEC) 10 MG tablet Take 10 mg by mouth daily.      cholecalciferol, vitamin D3, 2,000 unit capsule Take 1 capsule (2,000 Units total) by mouth daily.      docusate sodium (COLACE) 50 MG capsule Take 2 capsules (100 mg total) by mouth 2 (two) times a day.      doxylamine (UNISOM) 25 mg tablet Take 1 tablet (25 mg total) by mouth daily.      folic acid (FOLVITE) 1 MG tablet  6/7/2018: Received from: External Pharmacy     magnesium 200 mg Tab Take 1 tablet by mouth at bedtime.      ondansetron (ZOFRAN-ODT) 8 MG disintegrating tablet Take 1 tablet (8 mg total) by mouth 4 (four) times a day.      pyridoxine, vitamin B6, (VITAMIN B-6) 25 MG tablet Take 25 mg by mouth 2 (two) times a day.      senna (SENOKOT) 8.6 mg tablet Take 1 tablet by mouth daily.        Family History:     Family History   Problem Relation Age of Onset     Heart disease Mother      Heart disease Brother      Diabetes Maternal Grandmother      Cancer Maternal Grandfather        Review of Systems  All other systems reviewed and are negative.         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  Does a lot of typing.        Objective:     /78 (Patient Site: Left Arm, Patient Position: Sitting, Cuff Size: Adult Regular)  Pulse 74  Ht 5' 4.9\" (1.648 m)  Wt (!) 226 lb (102.5 kg)  LMP 01/31/2018  BMI 37.72 kg/m2  Physical Examination: General appearance - alert, well appearing, and in no distress  Eyes: pupils equal and reactive, extraocular eye movements intact  Neurological: alert, oriented, normal speech, no focal findings or movement disorder noted.    Extremities: No edema, no clubbing or cyanosis  Musculoskeletal: normal hand  strength.  She did have positive Tinel's test and positive Phalen test of her right upper extremity.  No tenderness to palpation over the medial or lateral epicondyle.  Pulses of right upper extremity grossly intact.  Psychiatric: Normal affect. Does not appear anxious or depressed.    No results found for this or any previous visit (from the past 168 hour(s)).      Sury Perea MD  6/25/2018  2:26 PM        "

## 2021-06-18 NOTE — PROGRESS NOTES
Transfer of care Visit    Transfer of care from: [facility]  Number of visits: 5  Initial visit date: 3/28/2018 for confirmation of pregnancy visit  Pre-preg wgt: 216 pounds  Initial platelets: 365K  UC: GBS bacteriuria  GBS status if known: GBS bacteriuria on IOB  GCT if done: Both negative  Risks, progress thus far: Prepregnancy obesity and THC dependence  Reason for transfer: Desires to deliver at , and desiring closer medical attention to nausea and vomiting of pregnancy    FIRST TRANSFER OF CARE OBSTETRICAL EXAM - OB   Assessment / Impression   1.  33-year-old  1 para 0 with IUP at 17 weeks 1 day  2.  Significant nausea and vomiting of pregnancy (not meeting criteria for hyperemesis gravidarum) managed with p.o. vitamin B6,  Unisom and p.o. Zofran  3.  Chronic THC use over 15 years, continuing to use 2-3 times daily  4.  Obesity  5.  GBS bacteriuria    Plan:      -IOB labs results reviewed.  -CBC, CMP, thyroid cascade today secondary to nausea and vomiting of pregnancy.  -UA completed last week on 2018  -Pt is not at risk for and therefore not interested in drawing lead level.   -Patient is NOT interested in waterbirth.   -Reviewed prenatal care schedule.   -Optimal nutrition and weight gain discussed. Pregnancy weight gain of 11-20 lbs (BMI 30.0 or greater) encouraged.   -Anticipatory guidance for common pregnancy questions and concerns reviewed.   -Danger s/sx for this trimester reviewed with patient.   -Reviewed genetic screening options with patient, does NOT elect for quad screening.   -Reviewed carrier testing options with patient, patient does NOT elect for testing or referral to genetic counseling.  -IOB packet given and reviewed with patient.   -CNM services and hospital options reviewed; emergency and scheduling phone numbers given to patient.   -Return to clinic in 4 weeks or sooner as needed  -Prescriptions for magnesium, Colace and calcium sent to patient's  preferred pharmacy.  Continue vitamin B6 and Zofran as directed.  Call or return to clinic if nausea and vomiting of pregnancy worsens.  -Plan GBS IV antibiotic prophylaxis in labor.  -Recommend cessation of THC use and discussed fetal effects.    Total time spent with patient: 40 minutes, >50% time spent counseling and coordinating care.   Subjective: (HPI)   Ton López is a 33 y.o. G 1 p 0 here today for her first obstetrical exam at 17w3d. Here by herself. This pregnancy is planned Attempting pregnancy for 6 years. The patient reports nausea and vomiting about 4 times per day since starting Zofran.  Prepregnancy weight 216 pounds, and today's weight is 226 pounds.  Patient's last menstrual period was 2018., predicting an expected date of delivery of Estimated Date of Delivery: 2018.  However, patient underwent an 8 week 1 day ultrasound on 2018 revealing an estimated date of birth of 2018 WORKING SKIP.  Her pregnancy is dated by the 8 week 1 day ultrasound on 2018 predicting SKIP 2018.   The patient states that she is in a monogamous relationship and states that she is safe.   Current symptoms also include: breast tenderness and fatigue.   Risk factors: Prepregnancy obesity and chronic THC use ×15 years, current. Pregnancy Risk Factors: Significantly overweight or underweight and Street drug use   Social History:   Education level: High school graduate  Occupation: Psychiatry assistant  Partners name: Deven   ?   OB History    Para Term  AB Living   1        SAB TAB Ectopic Multiple Live Births             # Outcome Date GA Lbr Frederick/2nd Weight Sex Delivery Anes PTL Lv   1 Current                  History:   Past Medical History:   Diagnosis Date     Abnormal Pap smear of cervix 2016     ADD (attention deficit disorder)     Prior to conception was taking Adderall 60 mg extended release daily and 10 mg instant release daily     Headache      Infertility, female      Unprotected sexual intercourse for 6 years before conception     Moderate tetrahydrocannabinol (THC) dependence (H)      Obesity      Seasonal allergies       Past Surgical History:   Procedure Laterality Date     APPENDECTOMY  1995      Family History   Problem Relation Age of Onset     Heart disease Mother      Heart disease Brother      Diabetes Maternal Grandmother      Cancer Maternal Grandfather       Social History   Substance Use Topics     Smoking status: Never Smoker     Smokeless tobacco: Never Used     Alcohol use None      Current Outpatient Prescriptions   Medication Sig Dispense Refill     cetirizine (ZYRTEC) 10 MG tablet Take 10 mg by mouth daily.       senna (SENOKOT) 8.6 mg tablet Take 1 tablet by mouth daily.       calcium, as carbonate, (OS-ARNALDO) 500 mg calcium (1,250 mg) tablet Take 2 tablets (1,000 mg total) by mouth 3 (three) times a day. 60 tablet 0     docusate sodium (COLACE) 50 MG capsule Take 2 capsules (100 mg total) by mouth 2 (two) times a day. 60 capsule 2     doxylamine (UNISOM) 25 mg tablet Take 1 tablet (25 mg total) by mouth daily. 30 tablet 2     folic acid (FOLVITE) 1 MG tablet   0     hydrOXYzine HCl (ATARAX) 50 MG tablet   0     magnesium 200 mg Tab Take 1 tablet by mouth at bedtime. 30 each 0     ondansetron (ZOFRAN-ODT) 8 MG disintegrating tablet Take 1 tablet (8 mg total) by mouth 4 (four) times a day. 120 tablet 0     pyridoxine, vitamin B6, (VITAMIN B-6) 25 MG tablet Take 25 mg by mouth 2 (two) times a day.       No current facility-administered medications for this visit.       Allergies   Allergen Reactions     Latex Dermatitis      The patient's medical, surgical and family histories were reviewed and were pertinent to this visit.   Pap smear: Last Pap: 8/18/2016, Result: Negative.       EPDS score today: 8/30  Review of Systems   General: Fatigue but otherwise denies problem   Eyes: Denies problem   Ears/Nose/Throat: Denies problem   Cardiovascular: Denies problem  "  Respiratory: Denies problem   Gastrointestinal: See HPI  Genitourinary: Denies any discharge, vaginal bleeding or itchiness or any other problem   Musculoskeletal: Breast tenderness otherwise denies problem   Skin: Denies problem   Neurologic: Denies problem   Psychiatric: Denies problem   Endocrine: Denies problem   Heme/Lymphatic: Denies problem   Allergic/Immunologic: Denies problem       Objective:   Objective    Vitals:    06/13/18 1505   BP: 114/60   Pulse: 84   Weight: (!) 226 lb (102.5 kg)   Height: 5' 4.9\" (1.648 m)      Physical Exam:   General Appearance: Alert, cooperative, no distress, appears stated age   FHT: 156 bpm  Uterus: Nontender, enlarged approximately 18 weeks size   Musculoskeletal: Extremities normal, atraumatic, no cyanosis   Skin: Skin color, texture, turgor normal, no rashes or lesions   Lymph nodes: Cervical, supraclavicular, and axillary nodes normal   Neurologic: Alert and oriented x 3. Normal speech   Lab:   Results for orders placed or performed in visit on 06/13/18   Comprehensive Metabolic Panel   Result Value Ref Range    Sodium 137 136 - 145 mmol/L    Potassium 4.3 3.5 - 5.0 mmol/L    Chloride 105 98 - 107 mmol/L    CO2 24 22 - 31 mmol/L    Anion Gap, Calculation 8 5 - 18 mmol/L    Glucose 82 70 - 125 mg/dL    BUN 8 8 - 22 mg/dL    Creatinine 0.57 (L) 0.60 - 1.10 mg/dL    GFR MDRD Af Amer >60 >60 mL/min/1.73m2    GFR MDRD Non Af Amer >60 >60 mL/min/1.73m2    Bilirubin, Total 0.2 0.0 - 1.0 mg/dL    Calcium 9.5 8.5 - 10.5 mg/dL    Protein, Total 6.5 6.0 - 8.0 g/dL    Albumin 3.3 (L) 3.5 - 5.0 g/dL    Alkaline Phosphatase 49 45 - 120 U/L    AST 12 0 - 40 U/L    ALT 15 0 - 45 U/L   Thyroid Cascade   Result Value Ref Range    TSH 1.52 0.30 - 5.00 uIU/mL   HM1 (CBC with Diff)   Result Value Ref Range    WBC 12.5 (H) 4.0 - 11.0 thou/uL    RBC 3.48 (L) 3.80 - 5.40 mill/uL    Hemoglobin 11.7 (L) 12.0 - 16.0 g/dL    Hematocrit 32.8 (L) 35.0 - 47.0 %    MCV 94 80 - 100 fL    MCH 33.6 27.0 " - 34.0 pg    MCHC 35.7 32.0 - 36.0 g/dL    RDW 12.4 11.0 - 14.5 %    Platelets 309 140 - 440 thou/uL    MPV 9.9 8.5 - 12.5 fL    Neutrophils % 71 (H) 50 - 70 %    Lymphocytes % 22 20 - 40 %    Monocytes % 7 2 - 10 %    Eosinophils % 1 0 - 6 %    Basophils % 1 0 - 2 %    Neutrophils Absolute 8.8 (H) 2.0 - 7.7 thou/uL    Lymphocytes Absolute 2.7 0.8 - 4.4 thou/uL    Monocytes Absolute 0.8 0.0 - 0.9 thou/uL    Eosinophils Absolute 0.1 0.0 - 0.4 thou/uL    Basophils Absolute 0.1 0.0 - 0.2 thou/uL   OB External Results   Result Value Ref Range    ABO/Rh External Result A Positive     Antibody External Result Negative     Hemoglobin External Result 12.2     Rubella External Result Immune     HBsAg External Result Non-reactive     HIV External Result Non-Reactive     Hep C External Result Non-reactive     Platelet Count External Result 365     RPR External Result Non-Reactive     Urinalysis   Order: [id number]   Status:  Final result   Visible to patient:  Yes (MyChart)   Next appt:  07/03/2018 at 05:00 PM in Radiology ([facility] Rm 2)   Dx:  Nausea and vomiting      Ref Range & Units 6/7/18  2:30 PM     Color, UA Colorless, Yellow, Straw, Light Yellow Yellow   Clarity, UA Clear Clear   Glucose, UA Negative Negative   Bilirubin, UA Negative Negative   Ketones, UA Negative Negative   Specific Gravity, UA 1.005 - 1.030 1.025   Blood, UA Negative Negative   pH, UA 5.0 - 8.0 7.0   Protein, UA Negative mg/dL 100 mg/dL (!)   Urobilinogen, UA 0.2 E.U./dL, 1.0 E.U./dL 0.2 E.U./dL   Nitrite, UA Negative Negative   Leukocytes, UA Negative Negative   Resulting Agency  [facility]      Specimen Collected: 06/07/18  2:30 PM Last Resulted: 06/07/18  2:34 PM Lab Flowsheet Order Details View Encounter Lab and Collection Details Routing Result History

## 2021-06-18 NOTE — PROGRESS NOTES
"OFFICE VISIT - FAMILY MEDICINE     ASSESSMENT AND PLAN     1. Strep pharyngitis  Rapid Strep A Screen- Throat Swab    amoxicillin (AMOXIL) 875 MG tablet   2. Pregnancy test positive     Continue with good hydration, extra vitamin C, gargle with salt and water, take the prescribed antibiotic as directed, possible side effect discussed.  Return if not improving.  Continue to follow with obstetrician for her pregnancy.    CHIEF COMPLAINT   Sore Throat (X 2 DAYS)    HPI   Ton López is a 33 y.o. female.  No Patient Care Coordination Note on file.  Sore throat with sensation of swollen gland in the throat for the past couple of days, feverish episode, decreased appetite, no specific exposure that she is aware of.  Has not tried any medication.  She is currently pregnant at around 5 months and has been followed by the obstetrician, she still taking Zofran as needed for nausea and vomiting of pregnancy, denies any worsening symptoms.      Review of Systems As per HPI, otherwise negative.    OBJECTIVE   /68 (Patient Site: Left Arm)  Pulse 68  Temp 98.6  F (37  C) (Oral)   Resp 13  Ht 5' 4.9\" (1.648 m)  Wt (!) 230 lb (104.3 kg)  LMP 01/31/2018  BMI 38.39 kg/m2  Physical Exam   Constitutional: She is oriented to person, place, and time. She appears well-developed and well-nourished.   HENT:   Head: Normocephalic and atraumatic.   Mild pharyngeal erythema   Neck: Normal range of motion. Neck supple. No JVD present. No tracheal deviation present. No thyromegaly present.   Cardiovascular: Normal rate, regular rhythm, normal heart sounds and intact distal pulses.  Exam reveals no gallop and no friction rub.    No murmur heard.  Pulmonary/Chest: Effort normal and breath sounds normal. No respiratory distress. She has no wheezes. She has no rales.   Abdominal:   Pregnant  abdomen ~ 5 months.   Musculoskeletal: She exhibits no edema or tenderness.   Lymphadenopathy:     She has no cervical adenopathy. "   Neurological: She is alert and oriented to person, place, and time. Coordination normal.   Psychiatric: She has a normal mood and affect. Judgment and thought content normal.       PFS     Family History   Problem Relation Age of Onset     Heart disease Mother      Heart disease Brother      Diabetes Maternal Grandmother      Cancer Maternal Grandfather      Social History     Social History     Marital status: Single     Spouse name: Deven Ferrer     Number of children: 0     Years of education: 12     Occupational History     Psychiatry assistant      Social History Main Topics     Smoking status: Never Smoker     Smokeless tobacco: Never Used     Alcohol use Not on file     Drug use: Yes     Special: Marijuana      Comment: several x  a day     Sexual activity: Yes     Partners: Male     Other Topics Concern     Not on file     Social History Narrative     Relevant history was reviewed with the patient today, unless noted in HPI, nothing is pertinent for this visit.  [facility]     Patient Active Problem List    Diagnosis Date Noted     Encounter for supervision of normal first pregnancy in second trimester 06/13/2018     GBS bacteriuria 06/13/2018     Obesity      Moderate tetrahydrocannabinol (THC) dependence (H)      Seasonal allergies      Nausea and vomiting 06/07/2018     Abnormal Pap smear of cervix 01/01/2016     Past Surgical History:   Procedure Laterality Date     APPENDECTOMY  1995       RESULTS/CONSULTS (Lab/Rad)     Recent Results (from the past 168 hour(s))   Rapid Strep A Screen- Throat Swab   Result Value Ref Range    Rapid Strep A Antigen Group A Strep detected (!) No Group A Strep detected, presumptive negative     No results found.  MEDICATIONS     Current Outpatient Prescriptions on File Prior to Visit   Medication Sig Dispense Refill     calcium, as carbonate, (OS-ARNALDO) 500 mg calcium (1,250 mg) tablet Take 2 tablets (1,000 mg total) by mouth 3 (three) times a day. 60 tablet 0     cetirizine  (ZYRTEC) 10 MG tablet Take 10 mg by mouth daily.       cholecalciferol, vitamin D3, 2,000 unit capsule Take 1 capsule (2,000 Units total) by mouth daily. 90 capsule 4     docusate sodium (COLACE) 50 MG capsule Take 2 capsules (100 mg total) by mouth 2 (two) times a day. 60 capsule 2     doxylamine (UNISOM) 25 mg tablet Take 1 tablet (25 mg total) by mouth daily. 30 tablet 2     folic acid (FOLVITE) 1 MG tablet   0     magnesium 200 mg Tab Take 1 tablet by mouth at bedtime. 30 each 0     ondansetron (ZOFRAN-ODT) 8 MG disintegrating tablet Take 1 tablet (8 mg total) by mouth 4 (four) times a day. 120 tablet 0     pyridoxine, vitamin B6, (VITAMIN B-6) 25 MG tablet Take 25 mg by mouth 2 (two) times a day.       senna (SENOKOT) 8.6 mg tablet Take 1 tablet by mouth daily.       No current facility-administered medications on file prior to visit.        HEALTH MAINTENANCE / SCREENING   PHQ-2 Total Score: 5 (6/7/2018  4:00 PM), PHQ-9 Total Score: 16 (6/7/2018  4:00 PM),No Data Recorded  Immunization History   Administered Date(s) Administered     DTP 1985, 1985, 1985, 09/18/1986     Hep B, Peds or Adolescent 07/03/1997, 08/19/1997, 06/07/1999     Influenza, Seasonal, Inj PF IIV3 10/19/2015     MMR 09/18/1986, 07/03/1997     OPV,Trivalent,Historic(8686-4197 only) 1985, 1985, 09/18/1986     Td, Adult, Absorbed 07/03/1997     Tdap 06/19/2015     Health Maintenance   Topic     ADVANCE DIRECTIVES DISCUSSED WITH PATIENT      PAP SMEAR      INFLUENZA VACCINE RULE BASED (Season Ended)     TDAP ADULT ONE TIME DOSE      TD 18+ HE        Jabari Dominguez MD  Family Medicine, [facility]     This note was dictated using a voice recognition software.  Any grammatical or context distortion are unintentional and inherent to the software.

## 2021-06-18 NOTE — PROGRESS NOTES
"Assessment:   1.  33-year-old  1 para 0 with presumed IUP at 16 weeks 2 days based on reported SKIP 2018 (NO current pregnancy medical records available for review)  2.  Severe nausea and vomiting of pregnancy not meeting criteria for hyperemesis gravidarum  3.  THC dependence, chronic ×15 years continuing use currently to self treat nausea of pregnancy  4.  Obesity  5.  ADD, not on stimulant at this time  6.  Seasonal allergies     Plan:      1. Discussed nutrition and exercise.  Acknowledged patient's struggle with nausea and vomiting this pregnancy.  2. Blood tests: Declines today.  Recommend CMP, CBC and thyroid cascade when patient returns to clinic in 1 week.  3.  Urine tests: Urinalysis for ketonuria.     4.  Letter provided to patient explaining severe nausea and vomiting of pregnancy and that patient has engaged with our practice to be proactive about treating this debilitating state.  I explained in detail that I am unable to \"excuse\" the patient from the previous 9 days missed from work.  Encouraged patient to call current OB provider who may be able to assist her with documentation.  5.  Continue vitamin B6, Unisom and Zyrtec as directed.  Okay to DC hydroxyzine if not helpful to sleep at night.  Zofran prescription refilled: 8 mg ODT 1 p.o. every 6 hours as needed nausea and vomiting of pregnancy, #120, no refills.  6.  Recommend discontinuing THC. Explained patient does not meet criteria for hyperemesis gravidarum  7.  If patient desires transfer of OB care to [facility], return to clinic in 1 week for IOB/Transfer, and ensure records have been transferred first.  8.  Encouraged patient to obtain the 24 hour on-call phone number for her current OB provider, MARCIA Ventura CNM with [facility].  9.  Addressed that with any danger signs and symptoms, patient could should contact current OB provider until there is a transfer of care.  10.  Second trimester danger signs and " "symptoms reviewed.  All questions answered.  Encouraged to call or return to clinic with any questions, concerns, or as needed.    Total time spent with patient: 45 minutes greater than 50% of which was spent counseling and coordinating care    Subjective:      Ton López is a 33 y.o. female who presents for a consultation with [facility] secondary to significant nausea and vomiting of pregnancy.  Patient currently receives her obstetric care through [facility] with nurse midwife MARCIA De La Rosa CNM.  Patient states upon learning of her pregnancy, she discontinued stimulant medication for ADD: Adderall 60 mg extended release daily and 10 mg instant release daily and discontinued daily marijuana use; patient endorses 15 years of chronic marijuana use prior to this pregnancy.  She was prescribed Zofran almost immediately upon the diagnosis of pregnancy due to severe nausea and vomiting; \"I was dry heaving every 20 minutes before Zofran, now I only vomit 4-8 times per day.\"  Able to drink 8-12 (17 ounce) bottles of water per day.  Immediately after discontinuation of Adderall and marijuana, patient lost 7 pounds and went down to 213 pounds (prepregnancy weight: 220 pounds).  With continued nausea and vomiting resulting in weight loss, patient self treated by restarting daily marijuana use \"vapiing\" 4 times a day in order to eat.  Patient believes this is been very helpful to generate an appetite and suppress nausea.  Denies having been hospitalized for nausea and vomiting of pregnancy nor has she required IV hydration thus far.  Vitamin B6 25 mg 1 p.o. 25 mg twice daily and Unisom 25 mg 1 p.o. at bedtime.  Prescribed hydroxyzine 100 mg p.o. nightly for sleep, but not effective and therefore self DC'd.  Taking Zyrtec 10 mg 1 p.o. daily for seasonal allergies.  Patient has seen [facility] in [place] for her psychiatric care in the past.  However, this clinic is not covered by her insurance, " "and she cannot afford to pay out-of-pocket for psychiatric visits not covered by insurance at this time.  PHQ 9: 16, \"very difficult\"  Significant nausea and vomiting has caused missed days of work, today being the ninth missed day.  Patient desires a letter describing debilitating nausea and vomiting of pregnancy AND a written excuse for days missed.  Fearful that she will lose her employment and therefore health insurance.    Gynecologic History  Patient's last menstrual period was 2018 (exact date).  Contraception: none    Cancer screening:   Last Pap: . Results were: normal per patient    OB History    Para Term  AB Living   1        SAB TAB Ectopic Multiple Live Births             # Outcome Date GA Lbr [place]/2nd Weight Sex Delivery Anes PTL Lv   1 Current                   Current Outpatient Prescriptions   Medication Sig Dispense Refill     docusate sodium (COLACE) 50 MG capsule Take by mouth 2 (two) times a day.       doxylamine (UNISOM) 25 mg tablet Take 25 mg by mouth daily.       pyridoxine, vitamin B6, (VITAMIN B-6) 25 MG tablet Take 25 mg by mouth 2 (two) times a day.       folic acid (FOLVITE) 1 MG tablet   0     hydrOXYzine HCl (ATARAX) 50 MG tablet   0     ondansetron (ZOFRAN-ODT) 8 MG disintegrating tablet Take 1 tablet (8 mg total) by mouth 4 (four) times a day. 120 tablet 0     No current facility-administered medications for this visit.      Past Medical History:   Diagnosis Date     Abnormal Pap smear of cervix      ADD (attention deficit disorder)     Prior to conception was taking Adderall 60 mg extended release daily and 10 mg instant release daily     Headache      Infertility, female     Unprotected sexual intercourse for 6 years before conception     Moderate tetrahydrocannabinol (THC) dependence      Obesity      Seasonal allergies      Past Surgical History:   Procedure Laterality Date     APPENDECTOMY       Review of patient's allergies indicates no known " "allergies.  Family History   Problem Relation Age of Onset     Heart disease Mother      Social History     Social History     Marital status: Single     Spouse name: Deven Ferrer     Number of children: 0     Years of education: 12     Occupational History     Psychiatry assistant      Social History Main Topics     Smoking status: Never Smoker     Smokeless tobacco: Never Used     Alcohol use Not on file     Drug use: Yes     Special: Marijuana      Comment: several x  a day     Sexual activity: Yes     Partners: Male     Other Topics Concern     Not on file     Social History Narrative       Review of Systems  General:  Denies problem  Eyes: Denies problem  Ears/Nose/Throat: Denies problem  Cardiovascular: Denies problem  Respiratory:  Denies problem  Gastrointestinal:  Please see HPI  Genitourinary: denies problems  Musculoskeletal:  Denies problem  Skin: Denies problem  Neurologic:denies problems  Psychiatric: denies problems  Endocrine: denies problems        Objective:         Vitals:    06/07/18 1359   BP: 122/80   Pulse: 88   Weight: 221 lb 11.2 oz (100.6 kg)   Height: 5' 4.9\" (1.648 m)       Physical Exam:  General Appearance: Alert, cooperative, no distress, appears stated age  Abdomen: Gravid, soft, 16 week size, non-tender. No organomegaly or masses FHR is 152 bpm.  Fundal height: 4 fingerbreadths below belly button.    "

## 2021-06-19 NOTE — PROGRESS NOTES
Ton presents alone today.   Ton's 28 year old brother  suddenly 1 week ago. Ton is tearful.   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   Ton reports that she would like to to complete [facility] paperwork again stating that it was not completed accurately the first time.  We completed the [facility] paperwork stating that she is eligible for 6 weeks off post vaginal birth, 8 weeks off for a .  Ton would like us to write for her to be off in case something comes up in her pregnancy.  Discussed with Ton that I am not able to request that she has time off without a medical diagnosis legitimizing the need for her leave.  I signed [facility] paperwork stating that she will have prenatal visits that she will need to be.  Ton has multiple concerns today including vomiting, insomnia, constipation  Nausea and vomiting: Probably reports she is feeling better and is not needing to take her Zofran as often.  She is requesting a refill today.  Advised that I prescribe 4 mg rather than 8 mg as her symptoms are improving.  She will take 4 mg tablets twice a day or as needed for vomiting.   Constipation: Ton reports her constipation is better with stool softeners. Encouraged fiber intake and hydration.   Insomnia: Probably was taking Benadryl every day for her chronic allergies and was advised to switch to Zyrtec.  She is currently taking Zyrtec daily for multiple environmental allergies.  Jayme reports she is sleeping 3 hours at night and is really struggling with insomnia.  She has tried Vistaril, no relief.  She is currently taking Unisom with B6 for her nausea and the Unisom is not helping with her sleeping.  Advised to take 25-50 mg of Benadryl intermittently to help with sleep.  Also strongly encouraged daily exercise.  Excessive weight gain: Total weight gain 18 pounds, reviewed ideal weight gain of  11-20 pounds.  Ton reports she is drinking soda pop daily for her headaches as she decided coffee was worse in pregnancy.  Shared with Ton that coffee is better than soda pop in pregnancy, as long as she keeps it under 200 mg of caffeine daily.  Discouraged drinking fluids with sugar, simple carbohydrates.  Reiterated that excessive weight gain can make pregnancy, labor, birth, recovery more difficult  Ton reports that she would like a tubal ligation and desires a consultation with Dr. Decker.  Will put referral in today.  Denies signs or symptoms of  labor or preeclampsia

## 2021-06-20 NOTE — PROGRESS NOTES
"Ton is here for problem visit in pregnancy at 34w0d. Here alone. C/o groin and back pain. States this started on Tuesday last week and progressively has gotten more intense and has made it more difficult to sleep. Tearful during visit. States she has a past back injury and feels this is being aggravated by her pregnancy. Reports past sciatic pain and that it feels like this has returned , more on the right side of her buttocks and upper leg. States she hasn't been to work since 10/3 due to her pain. Denies warnings signs. No bleeding, leaking water, contractions, dysuria, abdominal pain. She has tried Tylenol, warm packs, and yoga stretches but this has not relieved her pain. Denies vaginal symptoms. Baby is active \"he already knows karate!\". Discussed normal discomforts of pregnancy in the third trimester and relief measures including what she has already tried and additionally: chiropractic care, massage, and pregnancy support belt. Given resources for chiropractic care.     EPDS today . Reviewed 34 week handouts. Discussed GBS testing not indicated at routine time due to GBS bacteruria found earlier in pregnancy. Reviewed recommendations for IV PCN in labor. We also discussed recommendations and reasons for  testing beginning at 36 weeks due to BMI >40 and she accepts. Order placed for BPP to begin at 36 weeks. We also discussed this ultrasound would determine presentation as she does not want a vaginal exam at 36 week visit. States she has an appointment next week for tubal consult with Dr. Decker. She also states, \"I want to talk to her about having a CS\". I asked why she is wanting this and she states \"I've heard the rules are changing and you can have one for maternal request\". Also states she doesn't want to wait 6 weeks after the birth of her baby to have a tubal due to insurance issues. I expressed that she may talk to Dr. Decker about this but that operative birth is generally reserved " for clear maternal or fetal benefits due to the increased risks associated with  sections. Expresses understanding of this information. Desires work note to return to work; given today after visit.

## 2021-06-20 NOTE — PROGRESS NOTES
"Ton presents alone today. Accepts Tdap, GCT, Hgb, RPR  Encouraged Ton to request that Deven and other families get up to date with their Tdap as well  Excessive weight gain: Reviewed weight gain of 28 pounds.  Ton reports she is eating all day and all night to help manage her nausea.  Reports she is also drinking soda pop several times a day.  She is not drinking diet pop to avoid aspartame.   Encouraged Ton to focus on protein rich foods, with a goal of  g of protein a day, minimize simple sugars.  Brainstormed strategies to decrease soda pop intake including considering caffeinated tea, coffee, purchasing small hands of soda and trying to wean down.   -Reviewed increased risk of going a larger baby, difficulty giving birth vaginally, increased risk of  section, increased risk of baby developing metabolic disease later in life.  Ton states that \"she wants a  section anyway.\" As she is hoping for a tubal ligation. Reviewed that a CS is major abdominal surgery with potential complications and a longer recovery time.   Desires tubal ligation: Ton has not scheduled a consult appointment with Dr. Decker for a tubal ligation yet, but plans to do so soon.    Insomnia: Ton states she has tried everything to help with sleep - reducing caffeine, limiting screen time, taking a bath at night. Nothing has helped. She is drinking a can of caffeinated soda at 8pm,   Ton has tried everything to help with sleep - reducing caffiene late at night, limiting screen time, taking a bath at night, benadryl and Unisom. These interventions did not help and so she has not continued with them. She is drinking a can of soda at 8pm and watches TV before bed. Encouraged Ton to discontinue these practices and to try to exercise every day. Offered to refer her to a sleep clinic, and she declines for now. Ton does not think this insomnia is related to her brother's death  Headaches: Ton " "reports she gets headaches if she tries to discontinue her soda. States tylenol \"doesn't do anything for her.\" Denies severe HA, visual changes, RUQ pain  Nausea/Vomiting: Ton reports her nausea and vomiting have improved, she is now vomiting 1-2 times a day, but able to eat and drink normally.   Childbirth preparation: Ton does not think she and Deven will do any childbirth preparation classes. Reviewed options and the option for the Parent Topic Nights with the Childbirth collective. Discussed that even if she wants an epidural for labor, we recommend that she stay home in early labor and she might feel more comfortable if she knows what more to expect and how to manage early labor.   Breastfeeding: Jarrett states she is interested in breastfeeding for 1 month as she \"has to go back to work at 6 weeks and doesn't want to pump in the bathroom.\" Reviewed [place] state law that she is given a comfortable place to pump and that we would be willing to support her in any way - such as writing a letter. Discussed that even 1 ounce of breastmilk confers the immunological benefit to her baby.   Reviewed s/sx of PTL. Ton denies all.   BMI >40: please review recommendation for weekly  screening at 36 weeks and IOL at 39 weeks at next visit.   "

## 2021-06-20 NOTE — PROGRESS NOTES
Ton López is here by herself for a routine prenatal visit at 32w3d.  She has no concerns and is feeling well.    She is feeling fetal movement regularly. Fetal maturity and expectations for fetal movement in the third trimester.  Appetite is normal. Interval weight gain is excessive.  TdaP given at last visit.   Discussed how to pre-register on our website after 30 weeks. Discussed birth control options after delivery, patient PP tubal ligation.  Has appointment scheduled with Dr. Decker in October.  Discussed choosing a provider for infant care, patient undecided at this time.   Zyrtec refilled per patient request  Flu vaccine today  Anticipatory guidance, warning signs (with emphasis on  labor signs and symptoms), when to call and CNM contact information reviewed.     NV Please discuss  testing beginning at 36 weeks and IOL at 39 weeks due to BMI >40

## 2021-06-21 NOTE — PROGRESS NOTES
Chief Complaint   Patient presents with     Sore Throat     9 MONTHS PREG., SORE THROAT X 2 WEEKS ON AND OFF         HPI      Patient is here for 2 wks of moderate sore throat. No fever, cough. Pregnant at 36+4 wks.    ROS: Pertinent ROS noted in HPI.     Allergies   Allergen Reactions     Latex Dermatitis       Patient Active Problem List   Diagnosis     Nausea and vomiting     Moderate tetrahydrocannabinol (THC) dependence in early remission (H)     Abnormal Pap smear of cervix     Seasonal allergies     Encounter for supervision of normal first pregnancy in third trimester     GBS bacteriuria     Excessive weight gain during pregnancy in second trimester     Class 2 obesity in adult     Other insomnia     Anemia during pregnancy in second trimester       Family History   Problem Relation Age of Onset     Heart disease Mother      Heart disease Brother      Diabetes Maternal Grandmother      Cancer Maternal Grandfather        Social History     Social History     Marital status: Single     Spouse name: Deven Ferrer     Number of children: 0     Years of education: 12     Occupational History     Psychiatry assistant      Social History Main Topics     Smoking status: Never Smoker     Smokeless tobacco: Never Used     Alcohol use Not on file     Drug use: Yes     Special: Marijuana      Comment: several x  a day     Sexual activity: Yes     Partners: Male     Birth control/ protection: None     Other Topics Concern     Not on file     Social History Narrative         Objective:    Vitals:    10/27/18 1418   BP: 124/84   Pulse: 87   Resp: 18   Temp: 97.9  F (36.6  C)   SpO2: 98%       Gen:NAD  Throat: oropharynx clear, tonsils normal  Ears: TMs clear without effusion, canals normal with minimal cerumen  Nose: traces of clear rhinorrhea  Neck:NAD  CV: RRR, normal S1S2, no M, R, G  Pulm: CTAB, normal effort                                                Rapid Strep A Screen-Throat   Result Value Ref Range    Rapid  Strep A Antigen No Group A Strep detected, presumptive negative No Group A Strep detected, presumptive negative       Ton was seen today for sore throat.    Diagnoses and all orders for this visit:    Sore throat  -     Rapid Strep A Screen-Throat  -     Group A Strep, RNA Direct Detection, Throat

## 2021-06-21 NOTE — ANESTHESIA CARE TRANSFER NOTE
Last vitals:   Vitals:    11/12/18 2007   BP: 107/60   Pulse: 77   Resp: 16   Temp: 36.7  C (98  F)   SpO2: 98%     Patient's level of consciousness is awake  Spontaneous respirations: yes  Maintains airway independently: yes  Dentition unchanged: yes  Oropharynx: oropharynx clear of all foreign objects    QCDR Measures:  ASA# 20 - Surgical Safety Checklist: WHO surgical safety checklist completed prior to induction    PQRS# 430 - Adult PONV Prevention: 4558F-8P - Pt did NOT receive => 2 anti-emetic agents  ASA# 8 - Peds PONV Prevention: NA - Not pediatric patient, not GA or 2 or more risk factors NOT present  PQRS# 424 - Sandra-op Temp Management: 4559F - At least one body temp DOCUMENTED => 35.5C or 95.9F within required timeframe  PQRS# 426 - PACU Transfer Protocol: - Transfer of care checklist used  ASA# 14 - Acute Post-op Pain: ASA14B - Patient did NOT experience pain >= 7 out of 10

## 2021-06-21 NOTE — ANESTHESIA PROCEDURE NOTES
Peripheral Block    Patient location during procedure: OR  Start time: 11/12/2018 7:39 PM  End time: 11/12/2018 7:49 PM  post-op analgesia per surgeon order as noted in medical record  Staffing:  Performing  Anesthesiologist: Jimmie Aleman MD  Preanesthetic Checklist  Completed: patient identified, site marked, risks, benefits, and alternatives discussed, timeout performed, consent obtained, airway assessed, oxygen available, suction available, emergency drugs available and hand hygiene performed  Peripheral Block  Block type: other, TAP  Prep: ChloraPrep  Patient position: supine  Patient monitoring: blood pressure, heart rate, continuous pulse oximetry and cardiac monitor  Laterality: bilateral, same technique used bilaterally  Injection technique: ultrasound guided    Ultrasound used to visualize needle placement in proximity to nerve being blocked: yes   Permanent ultrasound image captured for medical record  Sterile gel and probe cover used for ultrasound.    Needle  Needle type: Stimuplex   Needle gauge: 20G  Needle length: 6 in  no peripheral nerve catheter placed  Assessment  Injection assessment: no difficulty with injection, negative aspiration for heme, no paresthesia on injection and incremental injection

## 2021-06-21 NOTE — PROGRESS NOTES
Ton is here today with severe back pain at 34w6d. She had a flare up last week and is unable to work so she is requesting to start her disability now. She has started seeing a chiropractor twice weekly for treatments. Taking Tylenol 1000mg po every 4 hours when awake. Reviewed maximum daily dose should not exceed 4000 mg. Denies use of THC and states she hasn't used since 4 1/2 months of pregnancy. Please obtain urine drug screen next visit. Has not been done since TERRI. Disability paperwork filled out today, advised no work until delivery. Has appt Thursday for tubal ligation consult and next week will have return prenatal visit and BPP.

## 2021-06-21 NOTE — ANESTHESIA PROCEDURE NOTES
Spinal Block    Patient location during procedure: OR  Start time: 11/12/2018 6:30 PM  End time: 11/12/2018 6:34 PM    Staffing:  Performing  Anesthesiologist: Jimmie Aleman MD    Preanesthetic Checklist  Completed: patient identified, risks, benefits, and alternatives discussed, timeout performed, consent obtained, airway assessed, oxygen available, suction available, emergency drugs available and hand hygiene performed  Spinal Block  Patient position: sitting  Prep: ChloraPrep and site prepped and draped  Patient monitoring: blood pressure, continuous pulse ox, cardiac monitor and heart rate  Approach: midline  Location: L4-5  Injection technique: single-shot  Needle type: pencil-tip   Needle gauge: 25 G (5 inch)

## 2021-06-21 NOTE — ANESTHESIA POSTPROCEDURE EVALUATION
Patient: Ton MILLER Maribel   SECTION, WITH POSTPARTUM TUBAL LIGATION  Anesthesia type: spinal    Patient location: Labor and Delivery  Last vitals:   Vitals:    18 2200   BP: 140/73   Pulse: 80   Resp: 16   Temp:    SpO2: 96%     Post vital signs: stable  Level of consciousness: awake and responds to simple questions  Post-anesthesia pain: pain controlled  Post-anesthesia nausea and vomiting: no  Pulmonary: unassisted, return to baseline  Cardiovascular: stable and blood pressure at baseline  Hydration: adequate  Anesthetic events: no    QCDR Measures:  ASA# 11 - Sandra-op Cardiac Arrest: ASA11B - Patient did NOT experience unanticipated cardiac arrest  ASA# 12 - Sandra-op Mortality Rate: ASA12B - Patient did NOT die  ASA# 13 - PACU Re-Intubation Rate: NA - No ETT / LMA used for case  ASA# 10 - Composite Anes Safety: ASA10A - No serious adverse event    Additional Notes:

## 2021-06-21 NOTE — PROGRESS NOTES
"Here alone today. Feels normal FM and no regular UCs. BPP today was 8/8 and NST: reactive, baseline 120 with accels 15 x 15 bpm and no decelerations noted. Continues with acid reflux affecting sleep, waking with some emesis \"sick\" in her mouth at times.  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 STD will last 6 weeks. Encouraged to consider plan for labor; desires epidural and reviewed methods for coping in early labor. Interval weight gain is excessive, TWG is excessive. Reports no HA, vision changes, or RUQ pain. Reviewed warning s/sx and reasons to call.  Advised BPP/NST per plan already established and ordered BPP with plan for NST in clinic. Due to Prepregnancy BMI 35.9, discussed only IOL offered at 41w with recommendation by 42w.  "

## 2021-06-21 NOTE — PROGRESS NOTES
Here alone today. She presents after BPP ultrasound and notes plan made for NST in clinic. Reviewed BPP: , EFW >90%ile. NST: reactive, baseline 140 bpm, accelerations 15x15 bpm, no decelerations, toco UCs q 6 minutes, mild but noted by pt. States normal fetal movement and some increase to regular contractions but not in intensity. We reviewed  status until 37w and when to call. Advised on warning s/sx and reasons to call. Declines VE today, vertex by ultrasound confirmed. hgb done today. GBS in urine documented and not performed today. UTox performed today with pt consent; continues to abstains since 4 months of pregnancy. Notes some nausea that is intermittent and not associated with heartburn symptoms. Notes some glandular congestion when lying down; reports no URI s/sx, fever, or painful sore throat and sx resolve with standing for short period of time. Discussed congestion of tissue to back of throat with weight gain and increased incidence of sleep apnea, notes no disruption when sleeping but has insomnia and not sleeping much. Advised to call with concerns for this or if illness with fever or significant sore throat. Reviewed plan for weekly visits. Advised BPP/NST per plan already established and ordered BPP with plan for NST in clinic. Due to Prepregnancy BMI 35.9, discussed only IOL offered at 41w with recommendation by 42w. RTC 1 week.

## 2021-06-21 NOTE — PROGRESS NOTES
Here alone today for BP check. She feels well and reports no HA, vision changes, RUQ/epigastric pain or new swelling. We reviewed her elevated BP today diagnoses Gestational Hypertension and reviewed meaning of this diagnosis and indication for careful monitoring in the coming days as well as recommendation for IOL on Tuesday at 39w GA. Offers no questions and accepts plan of care. Will call with BP levels both Saturday and Sunday to on-call CNM and given number, will call with onset of any of the discussed s/sx of preeclampsia, and will plan for clinic visit on Monday and advised no need for BPP at that time. IOL scheduled for 11/13/18 with charge RN at [facility] with plan for ripening, no exam in clinic; given instruction for calling ahead and time of arrival. Offers no further questions. CNMs oncall notified to check in if no call from patient. RTC Monday 11/12.

## 2021-06-21 NOTE — ANESTHESIA PREPROCEDURE EVALUATION
Anesthesia Evaluation      Patient summary reviewed   No history of anesthetic complications     Airway   Mallampati: II  Neck ROM: full   Pulmonary - normal exam   (+) a smoker  (-) sleep apnea    ROS comment: Seasonal allergies; THC use.                         Cardiovascular - normal exam  (+) hypertension, ,      Neuro/Psych    (+) anxiety/panic attacks,     Comments: ADHD; THC/Marijuana use.    Endo/Other    (+) obesity, pregnant     GI/Hepatic/Renal - negative ROS           Dental - normal exam                        Anesthesia Plan  Planned anesthetic: spinal  Decadron, Zofran.  PE infusion.  Duramorph.  TAP blocks for POP per surgeon request.  ASA 3     Anesthetic plan and risks discussed with: patient  Anesthesia plan special considerations: antiemetics,   Post-op plan: routine recovery

## 2021-06-21 NOTE — PROGRESS NOTES
CC: The patient is being seen as a consult from Saadia Vela CNM, secondary to a desire for a primary  section and a tubal ligation.    HPI: The pt is a 33 y.o. SWF  at 35.2 weeks gestation who presents with a desire to have a  section and a tubal ligation.  She knows that this is the only child she wants.  She wants the  because she is worried that with her back issues, labor and delivery will cause more problems.  She also would prefer to not have any vaginal lacerations or stitches.  She says all the women in her family have had cesareans.  She is working on [employer] [place] and [employer] but has also applied to [place] in the meantime.      Past Medical History:   Diagnosis Date     ADD (attention deficit disorder)     Prior to conception was taking Adderall 60 mg extended release daily and 10 mg instant release daily     Infertility, female     Unprotected sexual intercourse for 6 years before conception     Moderate tetrahydrocannabinol (THC) dependence (H)      Obesity      Seasonal allergies        Past Surgical History:   Procedure Laterality Date     APPENDECTOMY         Patient's   Family History   Problem Relation Age of Onset     Heart disease Mother      Heart disease Brother      Diabetes Maternal Grandmother      Cancer Maternal Grandfather        Patient   Social History     Social History     Marital status: Single     Spouse name: Deven Ferrer     Number of children: 0     Years of education: 12     Occupational History     Psychiatry assistant      Social History Main Topics     Smoking status: Never Smoker     Smokeless tobacco: Never Used     Alcohol use None     Drug use: Yes     Special: Marijuana      Comment: several x  a day     Sexual activity: Yes     Partners: Male     Birth control/ protection: None     Other Topics Concern     None     Social History Narrative       Current Outpatient Prescriptions   Medication Sig Dispense Refill     ANTACID, CALCIUM CARBONATE, 200  "mg calcium (500 mg) chewable tablet TK 2 TS PO TID  2     calcium-vitamin D 500 mg(1,250mg) -200 unit per tablet Take 1 tablet by mouth 2 (two) times a day with meals. 60 tablet 1     cetirizine (ZYRTEC) 10 MG tablet Take 1 tablet (10 mg total) by mouth daily. 30 tablet 1     cholecalciferol, vitamin D3, 2,000 unit capsule Take 1 capsule (2,000 Units total) by mouth daily. 90 capsule 4     ferrous sulfate SR (SLOW FE) 142 mg (45 mg iron) TbER Take 1 tablet (45 mg total) by mouth daily with breakfast. (Patient taking differently: Take 45 mg by mouth daily with breakfast. States taking every other day) 60 tablet 1     folic acid (FOLVITE) 1 MG tablet Take 1 tablet (1 mg total) by mouth daily. 90 tablet 3     magnesium 200 mg Tab Take 1 tablet by mouth at bedtime. 30 each 2     ondansetron (ZOFRAN) 4 MG tablet Take 1 tablet (4 mg total) by mouth every 8 (eight) hours as needed for nausea. 30 tablet 1     senna (SENOKOT) 8.6 mg tablet Take 1 tablet by mouth daily.       pyridoxine, vitamin B6, (VITAMIN B-6) 25 MG tablet Take 25 mg by mouth 2 (two) times a day.       No current facility-administered medications for this visit.        Patient is allergic to latex.    ROS:  12 part ROS is negative aside from those symptoms in the HPI    PE:  /80  Pulse 88  Ht 5' 5\" (1.651 m)  Wt (!) 257 lb (116.6 kg)  LMP 2018  Breastfeeding? No  BMI 42.77 kg/m2          Body mass index is 42.77 kg/(m^2).    General: WN/WD WF, NAD  Abdomen: gravid  Psych: normal mood  Neuro: CN I-XII grossly intact  MS: normal gait    Assessment: 33 y.o. SWF  at 35.2 weeks gestation with a desire for an elective primary  and tubal ligation.    Plan: Both vaginal delivery and  delivery risks and benefits were discussed with her.  We discussed the approximately 1% risk with either method of delivery, just different risks with each.  We discussed the recovery with vaginal delivery is usually easier than with  " section and usually has a shorter stay in the hospital.  We also discussed the risks of surgery.  In particular we discussed the risk of bleeding, infection, and damage to other organs surrounding the uterus.  We discussed the hospital stay and recovery limitations, especially with lifting and driving.  We discussed the slightly increased risk of transient tachypnea of the  with  compared to vaginal delivery.  We discussed the timing of scheduled cesareans being no earlier than 39 weeks gestation unless there is some other medical issue that would make delivery earlier necessary.      Post partum tubal ligation, tubal ligation at the time of  section, and laparoscopic tubal ligation were discussed with the patient.  She was counseled on the permanence of each procedure, the approximately 1/200 failure rate and the increased risk for ectopic should pregnancy occur.  She was counseled that for the LTL she needs to wait until after her 6 week post partum check before the procedure would be done.  She was counseled that with the PPTL, if her HgB is too low or there are other complications, it won't be done.  She was counseled that if she has a  section the tubal would be done at that time.  Finally, she did sign the MA consent today, but was counseled that if the baby delivers before 30 days from now, the TL can't be done unless she is still , unless she does [employer] to maintain her BC/BS.  I also counseled her to check with her insurance to make sure an elective  section is covered.  She will let me know what is happening with her insurance so the  and tubal can be scheduled at the appropriate timing.    Approximately 30 minutes were spent with the patient with the majority in counseling.      10/23/18 The patient notified me that neither of her insurance carriers will pay for an elective primary  section, so surgery wasn't scheduled.

## 2021-06-21 NOTE — PROGRESS NOTES
Here today with Deven. Feels well but ready for baby's arrival. Notes no regular UCs. She is hypertensive today; reports no HA, vision changes, RUQ/epigastric pain, or swelling though trace pedal swelling on exam. We discussed BP result today and indication for labs to be drawn today as well as repeat BP tomorrow if labs wnl; advised will call with abnormal results to labs and this may impact timing of assessment or induction of labor. Also discussed post-dates management and induction of labor, Ton states she desires IOL at soonest possible. We reviewed 41 weeks is first available if her body requires cervical ripening, reviewed medications and methods used and expectations for stay. BPP: 8/8, nml JUAN CARLOS. NST: reactive, baseline 140 bpm, accelerations present 15x15 bpm, no decelerations noted. TWG remains excessive, interval weight gain down 2#. Advised on pre-eclampsia warning s/sx and reason to call. RTC tomorrow for BP check.

## 2021-06-23 NOTE — PROGRESS NOTES
Pt is currently 10 weeks postpartum and came to clinic today for paperwork only. She was seen postpartum 2 weeks by surgeon but declines follow up with midwives at 6 weeks. She has stopped breastfeedig because her milk supply dried up, so she is exclusively bottle feeding now. Denies pain or any problems postpartum. Is applying for assistance and needs work release forms completed.   P: forms completed, no restriction, may return to work immediately.  Total time spent 15 min, no exam, counseling only  Saadia Vela, APRN,CNM

## 2021-07-03 NOTE — ADDENDUM NOTE
Addendum Note by Marija Correia APRN, CNM at 6/7/2018  4:11 PM     Author: Marija Correia APRN, CNM Service: -- Author Type: Midwife    Filed: 6/7/2018  4:11 PM Encounter Date: 6/7/2018 Status: Signed    : Marija Correia APRN, CNM (Midwife)    Addended by: MARIJA CORREIA on: 6/7/2018 04:11 PM        Modules accepted: Orders

## 2021-07-03 NOTE — ADDENDUM NOTE
Addendum Note by Lucinda Arreaga RT (R) at 6/7/2018  4:14 PM     Author: Lucinda Arreaga RT (R) Service: -- Author Type: Radiologic Technologist    Filed: 6/7/2018  4:14 PM Encounter Date: 6/7/2018 Status: Signed    : Lucinda Arreaga RT (R) (Radiologic Technologist)    Addended by: LUCINDA ARREAGA on: 6/7/2018 04:14 PM        Modules accepted: Orders

## 2021-07-14 PROBLEM — O99.012 ANEMIA DURING PREGNANCY IN SECOND TRIMESTER: Status: RESOLVED | Noted: 2018-08-31 | Resolved: 2018-11-10

## 2021-07-14 PROBLEM — O26.02 EXCESSIVE WEIGHT GAIN DURING PREGNANCY IN SECOND TRIMESTER: Status: RESOLVED | Noted: 2018-08-31 | Resolved: 2019-02-04

## 2021-07-14 PROBLEM — O13.9 GESTATIONAL HYPERTENSION: Status: RESOLVED | Noted: 2018-11-08 | Resolved: 2018-11-12

## 2021-07-14 PROBLEM — O13.3 GESTATIONAL HYPERTENSION WITHOUT SIGNIFICANT PROTEINURIA IN THIRD TRIMESTER: Status: RESOLVED | Noted: 2018-11-09 | Resolved: 2018-11-12

## 2021-07-14 PROBLEM — Z34.90 PREGNANT: Status: RESOLVED | Noted: 2018-11-09 | Resolved: 2018-11-12

## 2021-09-26 ENCOUNTER — HEALTH MAINTENANCE LETTER (OUTPATIENT)
Age: 36
End: 2021-09-26

## 2021-12-15 ENCOUNTER — IMMUNIZATION (OUTPATIENT)
Dept: NURSING | Facility: CLINIC | Age: 36
End: 2021-12-15
Payer: COMMERCIAL

## 2021-12-15 PROCEDURE — 0064A COVID-19,PF,MODERNA (18+ YRS BOOSTER .25ML): CPT

## 2021-12-15 PROCEDURE — 91306 COVID-19,PF,MODERNA (18+ YRS BOOSTER .25ML): CPT

## 2021-12-15 PROCEDURE — 90686 IIV4 VACC NO PRSV 0.5 ML IM: CPT

## 2021-12-15 PROCEDURE — 90471 IMMUNIZATION ADMIN: CPT

## 2022-02-17 PROBLEM — Z34.03 ENCOUNTER FOR SUPERVISION OF NORMAL FIRST PREGNANCY IN THIRD TRIMESTER: Status: RESOLVED | Noted: 2018-06-13 | Resolved: 2019-09-26

## 2022-07-03 ENCOUNTER — HEALTH MAINTENANCE LETTER (OUTPATIENT)
Age: 37
End: 2022-07-03

## 2023-04-23 ENCOUNTER — HEALTH MAINTENANCE LETTER (OUTPATIENT)
Age: 38
End: 2023-04-23

## 2023-07-16 ENCOUNTER — HEALTH MAINTENANCE LETTER (OUTPATIENT)
Age: 38
End: 2023-07-16

## 2024-09-08 ENCOUNTER — HEALTH MAINTENANCE LETTER (OUTPATIENT)
Age: 39
End: 2024-09-08

## 2025-03-08 ENCOUNTER — HEALTH MAINTENANCE LETTER (OUTPATIENT)
Age: 40
End: 2025-03-08